# Patient Record
Sex: FEMALE | Race: WHITE | Employment: OTHER | URBAN - METROPOLITAN AREA
[De-identification: names, ages, dates, MRNs, and addresses within clinical notes are randomized per-mention and may not be internally consistent; named-entity substitution may affect disease eponyms.]

---

## 2017-01-27 RX ORDER — OXYBUTYNIN CHLORIDE 10 MG/1
TABLET, EXTENDED RELEASE ORAL
Qty: 90 TABLET | Refills: 1 | Status: SHIPPED | OUTPATIENT
Start: 2017-01-27 | End: 2017-07-25 | Stop reason: SDUPTHER

## 2017-03-27 ENCOUNTER — TELEPHONE (OUTPATIENT)
Dept: INTERNAL MEDICINE CLINIC | Age: 82
End: 2017-03-27

## 2017-07-25 RX ORDER — OXYBUTYNIN CHLORIDE 10 MG/1
TABLET, EXTENDED RELEASE ORAL
Qty: 90 TABLET | Refills: 0 | Status: SHIPPED | OUTPATIENT
Start: 2017-07-25 | End: 2017-10-19 | Stop reason: SDUPTHER

## 2017-09-05 RX ORDER — SIMVASTATIN 20 MG
TABLET ORAL
Qty: 90 TABLET | Refills: 0 | Status: SHIPPED | OUTPATIENT
Start: 2017-09-05 | End: 2017-12-19 | Stop reason: SDUPTHER

## 2017-10-20 RX ORDER — OXYBUTYNIN CHLORIDE 10 MG/1
TABLET, EXTENDED RELEASE ORAL
Qty: 90 TABLET | Refills: 0 | Status: SHIPPED | OUTPATIENT
Start: 2017-10-20 | End: 2018-01-26 | Stop reason: SDUPTHER

## 2017-12-19 RX ORDER — SIMVASTATIN 20 MG
TABLET ORAL
Qty: 90 TABLET | Refills: 0 | Status: SHIPPED | OUTPATIENT
Start: 2017-12-19 | End: 2018-03-30 | Stop reason: SDUPTHER

## 2018-01-26 RX ORDER — OXYBUTYNIN CHLORIDE 10 MG/1
TABLET, EXTENDED RELEASE ORAL
Qty: 90 TABLET | Refills: 0 | Status: SHIPPED | OUTPATIENT
Start: 2018-01-26 | End: 2018-04-27 | Stop reason: SDUPTHER

## 2018-03-30 RX ORDER — SIMVASTATIN 20 MG
TABLET ORAL
Qty: 90 TABLET | Refills: 0 | Status: SHIPPED | OUTPATIENT
Start: 2018-03-30 | End: 2018-07-14 | Stop reason: SDUPTHER

## 2018-04-27 RX ORDER — OXYBUTYNIN CHLORIDE 10 MG/1
TABLET, EXTENDED RELEASE ORAL
Qty: 90 TABLET | Refills: 0 | Status: SHIPPED | OUTPATIENT
Start: 2018-04-27 | End: 2018-07-25

## 2018-07-12 ASSESSMENT — ENCOUNTER SYMPTOMS
ABDOMINAL PAIN: 0
SHORTNESS OF BREATH: 0

## 2018-07-12 NOTE — PROGRESS NOTES
disease without esophagitis     3. Allergic rhinitis, unspecified chronicity, unspecified seasonality, unspecified trigger     4. Fatigue, unspecified type ---ck labs  TSH without Reflex    CBC Auto Differential   5. Bladder dysfunction --cont current rx---offer dr Ramón peralta for sec opinion  Adelfo Joseph MD ([facility])   6.  Abdominal pain, RUQ ---ck gb u/s  US GALLBLADDER RUQ   p-vax 23 ---today--info on TDaP and shgrx also given   ro 6mos --ext ov         Plan:

## 2018-07-14 RX ORDER — SIMVASTATIN 20 MG
TABLET ORAL
Qty: 90 TABLET | Refills: 0 | Status: SHIPPED | OUTPATIENT
Start: 2018-07-14 | End: 2018-10-17 | Stop reason: SDUPTHER

## 2018-07-17 ENCOUNTER — OFFICE VISIT (OUTPATIENT)
Dept: INTERNAL MEDICINE CLINIC | Age: 83
End: 2018-07-17

## 2018-07-17 VITALS
OXYGEN SATURATION: 97 % | HEART RATE: 65 BPM | TEMPERATURE: 97.6 F | WEIGHT: 112.4 LBS | SYSTOLIC BLOOD PRESSURE: 126 MMHG | DIASTOLIC BLOOD PRESSURE: 60 MMHG | HEIGHT: 62 IN | BODY MASS INDEX: 20.68 KG/M2 | RESPIRATION RATE: 16 BRPM

## 2018-07-17 DIAGNOSIS — R53.83 FATIGUE, UNSPECIFIED TYPE: ICD-10-CM

## 2018-07-17 DIAGNOSIS — Z23 NEED FOR PNEUMOCOCCAL VACCINATION: ICD-10-CM

## 2018-07-17 DIAGNOSIS — J30.9 ALLERGIC RHINITIS, UNSPECIFIED CHRONICITY, UNSPECIFIED SEASONALITY, UNSPECIFIED TRIGGER: ICD-10-CM

## 2018-07-17 DIAGNOSIS — K21.9 GASTROESOPHAGEAL REFLUX DISEASE WITHOUT ESOPHAGITIS: ICD-10-CM

## 2018-07-17 DIAGNOSIS — R10.11 ABDOMINAL PAIN, RUQ: ICD-10-CM

## 2018-07-17 DIAGNOSIS — N31.9 BLADDER DYSFUNCTION: ICD-10-CM

## 2018-07-17 DIAGNOSIS — E78.00 PURE HYPERCHOLESTEROLEMIA: Primary | ICD-10-CM

## 2018-07-17 LAB
A/G RATIO: 1.7 (ref 1.1–2.2)
ALBUMIN SERPL-MCNC: 4.5 G/DL (ref 3.4–5)
ALP BLD-CCNC: 74 U/L (ref 40–129)
ALT SERPL-CCNC: 13 U/L (ref 10–40)
ANION GAP SERPL CALCULATED.3IONS-SCNC: 15 MMOL/L (ref 3–16)
AST SERPL-CCNC: 22 U/L (ref 15–37)
BASOPHILS ABSOLUTE: 0 K/UL (ref 0–0.2)
BASOPHILS RELATIVE PERCENT: 0.4 %
BILIRUB SERPL-MCNC: <0.2 MG/DL (ref 0–1)
BUN BLDV-MCNC: 19 MG/DL (ref 7–20)
CALCIUM SERPL-MCNC: 9.7 MG/DL (ref 8.3–10.6)
CHLORIDE BLD-SCNC: 103 MMOL/L (ref 99–110)
CHOLESTEROL, TOTAL: 169 MG/DL (ref 0–199)
CO2: 26 MMOL/L (ref 21–32)
CREAT SERPL-MCNC: 0.8 MG/DL (ref 0.6–1.2)
EOSINOPHILS ABSOLUTE: 0 K/UL (ref 0–0.6)
EOSINOPHILS RELATIVE PERCENT: 0.7 %
GFR AFRICAN AMERICAN: >60
GFR NON-AFRICAN AMERICAN: >60
GLOBULIN: 2.7 G/DL
GLUCOSE BLD-MCNC: 83 MG/DL (ref 70–99)
HCT VFR BLD CALC: 39.9 % (ref 36–48)
HDLC SERPL-MCNC: 98 MG/DL (ref 40–60)
HEMOGLOBIN: 13.4 G/DL (ref 12–16)
LDL CHOLESTEROL CALCULATED: 57 MG/DL
LYMPHOCYTES ABSOLUTE: 2.2 K/UL (ref 1–5.1)
LYMPHOCYTES RELATIVE PERCENT: 39.5 %
MCH RBC QN AUTO: 33.2 PG (ref 26–34)
MCHC RBC AUTO-ENTMCNC: 33.6 G/DL (ref 31–36)
MCV RBC AUTO: 98.7 FL (ref 80–100)
MONOCYTES ABSOLUTE: 0.5 K/UL (ref 0–1.3)
MONOCYTES RELATIVE PERCENT: 8.6 %
NEUTROPHILS ABSOLUTE: 2.8 K/UL (ref 1.7–7.7)
NEUTROPHILS RELATIVE PERCENT: 50.8 %
PDW BLD-RTO: 13.9 % (ref 12.4–15.4)
PLATELET # BLD: 198 K/UL (ref 135–450)
PMV BLD AUTO: 9.7 FL (ref 5–10.5)
POTASSIUM SERPL-SCNC: 4.4 MMOL/L (ref 3.5–5.1)
RBC # BLD: 4.05 M/UL (ref 4–5.2)
SODIUM BLD-SCNC: 144 MMOL/L (ref 136–145)
TOTAL PROTEIN: 7.2 G/DL (ref 6.4–8.2)
TRIGL SERPL-MCNC: 71 MG/DL (ref 0–150)
TSH SERPL DL<=0.05 MIU/L-ACNC: 1.79 UIU/ML (ref 0.27–4.2)
VLDLC SERPL CALC-MCNC: 14 MG/DL
WBC # BLD: 5.5 K/UL (ref 4–11)

## 2018-07-17 PROCEDURE — 99215 OFFICE O/P EST HI 40 MIN: CPT | Performed by: INTERNAL MEDICINE

## 2018-07-17 PROCEDURE — 90732 PPSV23 VACC 2 YRS+ SUBQ/IM: CPT | Performed by: INTERNAL MEDICINE

## 2018-07-17 PROCEDURE — 90471 IMMUNIZATION ADMIN: CPT | Performed by: INTERNAL MEDICINE

## 2018-07-17 ASSESSMENT — ENCOUNTER SYMPTOMS
GASTROINTESTINAL NEGATIVE: 1
EYES NEGATIVE: 1
RESPIRATORY NEGATIVE: 1

## 2018-07-17 ASSESSMENT — PATIENT HEALTH QUESTIONNAIRE - PHQ9
SUM OF ALL RESPONSES TO PHQ9 QUESTIONS 1 & 2: 0
1. LITTLE INTEREST OR PLEASURE IN DOING THINGS: 0
SUM OF ALL RESPONSES TO PHQ QUESTIONS 1-9: 0
2. FEELING DOWN, DEPRESSED OR HOPELESS: 0

## 2018-07-19 ENCOUNTER — TELEPHONE (OUTPATIENT)
Dept: INTERNAL MEDICINE CLINIC | Age: 83
End: 2018-07-19

## 2018-07-19 DIAGNOSIS — R10.11 ABDOMINAL PAIN, RUQ: ICD-10-CM

## 2018-07-19 NOTE — TELEPHONE ENCOUNTER
[facility] Scheduling   #[phone]  Fax #[phone]    Requesting an order for  ultra sound gall bladder    Please Advise

## 2018-07-25 RX ORDER — OXYBUTYNIN CHLORIDE 10 MG/1
TABLET, EXTENDED RELEASE ORAL
Qty: 90 TABLET | Refills: 0 | Status: SHIPPED | OUTPATIENT
Start: 2018-07-25 | End: 2018-10-18 | Stop reason: SDUPTHER

## 2018-07-27 ENCOUNTER — TELEPHONE (OUTPATIENT)
Dept: INTERNAL MEDICINE CLINIC | Age: 83
End: 2018-07-27

## 2018-07-30 ENCOUNTER — TELEPHONE (OUTPATIENT)
Dept: INTERNAL MEDICINE CLINIC | Age: 83
End: 2018-07-30

## 2018-08-14 PROBLEM — R10.10 PAIN OF UPPER ABDOMEN: Status: ACTIVE | Noted: 2018-08-14

## 2018-08-14 ASSESSMENT — ENCOUNTER SYMPTOMS
ABDOMINAL PAIN: 0
SHORTNESS OF BREATH: 0

## 2018-08-15 ENCOUNTER — OFFICE VISIT (OUTPATIENT)
Dept: INTERNAL MEDICINE CLINIC | Age: 83
End: 2018-08-15

## 2018-08-15 VITALS
DIASTOLIC BLOOD PRESSURE: 66 MMHG | BODY MASS INDEX: 21.16 KG/M2 | HEART RATE: 58 BPM | TEMPERATURE: 98.2 F | WEIGHT: 115 LBS | OXYGEN SATURATION: 96 % | HEIGHT: 62 IN | RESPIRATION RATE: 16 BRPM | SYSTOLIC BLOOD PRESSURE: 106 MMHG

## 2018-08-15 DIAGNOSIS — R10.10 PAIN OF UPPER ABDOMEN: ICD-10-CM

## 2018-08-15 DIAGNOSIS — K21.9 GASTROESOPHAGEAL REFLUX DISEASE WITHOUT ESOPHAGITIS: ICD-10-CM

## 2018-08-15 DIAGNOSIS — E78.00 PURE HYPERCHOLESTEROLEMIA: Primary | ICD-10-CM

## 2018-08-15 LAB
BILIRUBIN, POC: NORMAL
BLOOD URINE, POC: NORMAL
CLARITY, POC: NORMAL
COLOR, POC: NORMAL
GLUCOSE URINE, POC: NORMAL
KETONES, POC: NORMAL
LEUKOCYTE EST, POC: NORMAL
NITRITE, POC: NORMAL
PH, POC: 6
PROTEIN, POC: NORMAL
SPECIFIC GRAVITY, POC: 1.02
UROBILINOGEN, POC: NORMAL

## 2018-08-15 PROCEDURE — 99214 OFFICE O/P EST MOD 30 MIN: CPT | Performed by: INTERNAL MEDICINE

## 2018-08-15 PROCEDURE — 81002 URINALYSIS NONAUTO W/O SCOPE: CPT | Performed by: INTERNAL MEDICINE

## 2018-10-17 RX ORDER — SIMVASTATIN 20 MG
TABLET ORAL
Qty: 90 TABLET | Refills: 0 | Status: SHIPPED | OUTPATIENT
Start: 2018-10-17 | End: 2019-01-28

## 2018-10-18 RX ORDER — OXYBUTYNIN CHLORIDE 10 MG/1
TABLET, EXTENDED RELEASE ORAL
Qty: 90 TABLET | Refills: 0 | Status: SHIPPED | OUTPATIENT
Start: 2018-10-18 | End: 2019-01-29 | Stop reason: SDUPTHER

## 2019-01-28 RX ORDER — SIMVASTATIN 20 MG
TABLET ORAL
Qty: 90 TABLET | Refills: 0 | Status: SHIPPED | OUTPATIENT
Start: 2019-01-28 | End: 2019-05-06 | Stop reason: SDUPTHER

## 2019-01-29 RX ORDER — OXYBUTYNIN CHLORIDE 10 MG/1
TABLET, EXTENDED RELEASE ORAL
Qty: 90 TABLET | Refills: 0 | Status: SHIPPED | OUTPATIENT
Start: 2019-01-29 | End: 2019-04-25 | Stop reason: SDUPTHER

## 2019-04-25 RX ORDER — OXYBUTYNIN CHLORIDE 10 MG/1
TABLET, EXTENDED RELEASE ORAL
Qty: 90 TABLET | Refills: 0 | Status: SHIPPED | OUTPATIENT
Start: 2019-04-25 | End: 2019-07-23 | Stop reason: SDUPTHER

## 2019-05-07 RX ORDER — SIMVASTATIN 20 MG
TABLET ORAL
Qty: 90 TABLET | Refills: 0 | Status: SHIPPED | OUTPATIENT
Start: 2019-05-07 | End: 2019-08-14 | Stop reason: SDUPTHER

## 2019-07-24 RX ORDER — OXYBUTYNIN CHLORIDE 10 MG/1
TABLET, EXTENDED RELEASE ORAL
Qty: 90 TABLET | Refills: 0 | Status: SHIPPED | OUTPATIENT
Start: 2019-07-24 | End: 2019-10-22 | Stop reason: SDUPTHER

## 2019-10-22 RX ORDER — OXYBUTYNIN CHLORIDE 10 MG/1
TABLET, EXTENDED RELEASE ORAL
Qty: 90 TABLET | Refills: 0 | Status: SHIPPED | OUTPATIENT
Start: 2019-10-22 | End: 2020-01-20

## 2020-01-20 RX ORDER — OXYBUTYNIN CHLORIDE 10 MG/1
TABLET, EXTENDED RELEASE ORAL
Qty: 90 TABLET | Refills: 0 | Status: SHIPPED | OUTPATIENT
Start: 2020-01-20 | End: 2020-04-14

## 2020-04-14 RX ORDER — OXYBUTYNIN CHLORIDE 10 MG/1
TABLET, EXTENDED RELEASE ORAL
Qty: 90 TABLET | Refills: 0 | Status: SHIPPED | OUTPATIENT
Start: 2020-04-14 | End: 2020-07-07

## 2020-06-23 PROBLEM — R19.8 CHANGE IN BOWEL MOVEMENT: Status: ACTIVE | Noted: 2020-06-23

## 2020-07-01 ENCOUNTER — HOSPITAL ENCOUNTER (OUTPATIENT)
Dept: ULTRASOUND IMAGING | Age: 85
Discharge: HOME | End: 2020-07-01
Payer: MEDICARE

## 2020-07-01 PROCEDURE — 76705 ECHO EXAM OF ABDOMEN: CPT

## 2020-07-07 RX ORDER — OXYBUTYNIN CHLORIDE 10 MG/1
TABLET, EXTENDED RELEASE ORAL
Qty: 90 TABLET | Refills: 0 | Status: SHIPPED | OUTPATIENT
Start: 2020-07-07 | End: 2020-10-12

## 2020-10-12 RX ORDER — OXYBUTYNIN CHLORIDE 10 MG/1
TABLET, EXTENDED RELEASE ORAL
Qty: 90 TABLET | Refills: 0 | Status: SHIPPED | OUTPATIENT
Start: 2020-10-12 | End: 2021-01-16

## 2021-01-16 RX ORDER — OXYBUTYNIN CHLORIDE 10 MG/1
TABLET, EXTENDED RELEASE ORAL
Qty: 90 TABLET | Refills: 0 | Status: SHIPPED | OUTPATIENT
Start: 2021-01-16 | End: 2021-04-13

## 2021-04-13 RX ORDER — SIMVASTATIN 20 MG
TABLET ORAL
Qty: 30 TABLET | Refills: 0 | Status: SHIPPED | OUTPATIENT
Start: 2021-04-13 | End: 2021-05-20

## 2021-04-13 RX ORDER — OXYBUTYNIN CHLORIDE 10 MG/1
TABLET, EXTENDED RELEASE ORAL
Qty: 90 TABLET | Refills: 0 | Status: SHIPPED | OUTPATIENT
Start: 2021-04-13 | End: 2021-07-12

## 2021-05-20 RX ORDER — SIMVASTATIN 20 MG
TABLET ORAL
Qty: 30 TABLET | Refills: 0 | Status: SHIPPED | OUTPATIENT
Start: 2021-05-20 | End: 2021-06-18

## 2021-06-18 RX ORDER — SIMVASTATIN 20 MG
TABLET ORAL
Qty: 30 TABLET | Refills: 0 | Status: SHIPPED | OUTPATIENT
Start: 2021-06-18 | End: 2021-10-22

## 2021-07-12 RX ORDER — OXYBUTYNIN CHLORIDE 10 MG/1
TABLET, EXTENDED RELEASE ORAL
Qty: 90 TABLET | Refills: 0 | Status: SHIPPED | OUTPATIENT
Start: 2021-07-12 | End: 2021-10-08

## 2021-10-08 RX ORDER — OXYBUTYNIN CHLORIDE 10 MG/1
TABLET, EXTENDED RELEASE ORAL
Qty: 90 TABLET | Refills: 0 | Status: SHIPPED | OUTPATIENT
Start: 2021-10-08 | End: 2022-01-07

## 2021-10-22 RX ORDER — SIMVASTATIN 20 MG
TABLET ORAL
Qty: 30 TABLET | Refills: 0 | Status: SHIPPED | OUTPATIENT
Start: 2021-10-22 | End: 2021-11-17

## 2022-02-28 PROBLEM — L29.9 ITCHY SKIN: Status: ACTIVE | Noted: 2022-02-28

## 2022-06-27 ENCOUNTER — HOSPITAL ENCOUNTER (OUTPATIENT)
Dept: SURGERY | Age: 87
Discharge: HOME | End: 2022-06-27
Payer: MEDICARE

## 2022-06-27 VITALS — DIASTOLIC BLOOD PRESSURE: 50 MMHG | SYSTOLIC BLOOD PRESSURE: 159 MMHG

## 2022-06-27 PROBLEM — S89.92XA INJURY OF LEFT LOWER EXTREMITY: Status: ACTIVE | Noted: 2022-06-27

## 2022-06-27 PROCEDURE — 6370000000 HC RX 637 (ALT 250 FOR IP): Performed by: OPHTHALMOLOGY

## 2022-06-27 PROCEDURE — 66821 AFTER CATARACT LASER SURGERY: CPT

## 2022-06-27 RX ORDER — TROPICAMIDE 10 MG/ML
1 SOLUTION/ DROPS OPHTHALMIC ONCE
Status: COMPLETED | OUTPATIENT
Start: 2022-06-27 | End: 2022-06-27

## 2022-06-27 RX ORDER — SODIUM CHLORIDE 0.9 % (FLUSH) 0.9 %
5-40 SYRINGE (ML) INJECTION PRN
Status: DISCONTINUED | OUTPATIENT
Start: 2022-06-27 | End: 2022-06-28 | Stop reason: HOSPADM

## 2022-06-27 RX ORDER — SODIUM CHLORIDE 9 MG/ML
INJECTION, SOLUTION INTRAVENOUS PRN
Status: DISCONTINUED | OUTPATIENT
Start: 2022-06-27 | End: 2022-06-28 | Stop reason: HOSPADM

## 2022-06-27 RX ORDER — SODIUM CHLORIDE 0.9 % (FLUSH) 0.9 %
5-40 SYRINGE (ML) INJECTION EVERY 12 HOURS SCHEDULED
Status: DISCONTINUED | OUTPATIENT
Start: 2022-06-27 | End: 2022-06-28 | Stop reason: HOSPADM

## 2022-06-27 RX ORDER — PHENYLEPHRINE HCL 2.5 %
1 DROPS OPHTHALMIC (EYE) ONCE
Status: COMPLETED | OUTPATIENT
Start: 2022-06-27 | End: 2022-06-27

## 2022-06-27 RX ADMIN — PHENYLEPHRINE HYDROCHLORIDE 1 DROP: 25 SOLUTION/ DROPS OPHTHALMIC at 10:37

## 2022-06-27 RX ADMIN — TROPICAMIDE 1 DROP: 10 SOLUTION/ DROPS OPHTHALMIC at 10:37

## 2022-06-27 NOTE — OP NOTE
[place] [address]  [phone]      6/27/2022    Patient name: Andrea Petty  YOB: 1926  MRN: [id number]    Allergies: Allergies   Allergen Reactions    Codeine     Sulfa Antibiotics        Pre-operative diagnosis:  After cataract obscuring vision of the right eye. Post-operative diagnosis:  Same    Procedure Performed:  YAG Capsulotomy of the right eye. Anesthesia:  None    Estimated Blood Loss: None    Specimens removed: None    Complications:  None    Description of Procedure: A Yag Capsulotomy was performed today on the right eye. Pt appears to be oriented to time, place, and person. Pre-op medications instilled in the right eye: Proparacaine 1 drop, Won-Synephrine 2.5% 1 drop topical and Mydriacyl 0.5% 1 drop topical. Patient is comfortable and will be released to self.      Electronically signed by: Dottie Pool MD,6/27/2022,11:39 AM

## 2022-07-11 ENCOUNTER — HOSPITAL ENCOUNTER (OUTPATIENT)
Dept: WOUND CARE | Age: 87
Discharge: HOME | End: 2022-07-11
Payer: MEDICARE

## 2022-07-11 VITALS
WEIGHT: 116 LBS | RESPIRATION RATE: 18 BRPM | TEMPERATURE: 98.6 F | DIASTOLIC BLOOD PRESSURE: 74 MMHG | SYSTOLIC BLOOD PRESSURE: 150 MMHG | HEIGHT: 62 IN | HEART RATE: 66 BPM | BODY MASS INDEX: 21.35 KG/M2

## 2022-07-11 DIAGNOSIS — M79.89 LEG SWELLING: ICD-10-CM

## 2022-07-11 DIAGNOSIS — S81.802A TRAUMATIC OPEN WOUND OF LEFT LOWER LEG, INITIAL ENCOUNTER: Primary | ICD-10-CM

## 2022-07-11 PROCEDURE — 99202 OFFICE O/P NEW SF 15 MIN: CPT | Performed by: NURSE PRACTITIONER

## 2022-07-11 PROCEDURE — 11042 DBRDMT SUBQ TIS 1ST 20SQCM/<: CPT

## 2022-07-11 PROCEDURE — 11042 DBRDMT SUBQ TIS 1ST 20SQCM/<: CPT | Performed by: NURSE PRACTITIONER

## 2022-07-11 PROCEDURE — 99203 OFFICE O/P NEW LOW 30 MIN: CPT

## 2022-07-11 RX ORDER — LIDOCAINE 50 MG/G
OINTMENT TOPICAL ONCE
Status: CANCELLED | OUTPATIENT
Start: 2022-07-11 | End: 2022-07-11

## 2022-07-11 RX ORDER — GINSENG 100 MG
CAPSULE ORAL ONCE
Status: CANCELLED | OUTPATIENT
Start: 2022-07-11 | End: 2022-07-11

## 2022-07-11 RX ORDER — GENTAMICIN SULFATE 1 MG/G
OINTMENT TOPICAL ONCE
Status: CANCELLED | OUTPATIENT
Start: 2022-07-11 | End: 2022-07-11

## 2022-07-11 RX ORDER — LIDOCAINE HYDROCHLORIDE 20 MG/ML
JELLY TOPICAL ONCE
Status: CANCELLED | OUTPATIENT
Start: 2022-07-11 | End: 2022-07-11

## 2022-07-11 RX ORDER — BACITRACIN ZINC AND POLYMYXIN B SULFATE 500; 1000 [USP'U]/G; [USP'U]/G
OINTMENT TOPICAL ONCE
Status: CANCELLED | OUTPATIENT
Start: 2022-07-11 | End: 2022-07-11

## 2022-07-11 RX ORDER — CLOBETASOL PROPIONATE 0.5 MG/G
OINTMENT TOPICAL ONCE
Status: CANCELLED | OUTPATIENT
Start: 2022-07-11 | End: 2022-07-11

## 2022-07-11 RX ORDER — LIDOCAINE HYDROCHLORIDE 40 MG/ML
SOLUTION TOPICAL ONCE
Status: CANCELLED | OUTPATIENT
Start: 2022-07-11 | End: 2022-07-11

## 2022-07-11 RX ORDER — LIDOCAINE 40 MG/G
CREAM TOPICAL ONCE
Status: CANCELLED | OUTPATIENT
Start: 2022-07-11 | End: 2022-07-11

## 2022-07-11 RX ORDER — BETAMETHASONE DIPROPIONATE 0.05 %
OINTMENT (GRAM) TOPICAL ONCE
Status: CANCELLED | OUTPATIENT
Start: 2022-07-11 | End: 2022-07-11

## 2022-07-11 RX ORDER — LIDOCAINE HYDROCHLORIDE 40 MG/ML
SOLUTION TOPICAL ONCE
Status: COMPLETED | OUTPATIENT
Start: 2022-07-11 | End: 2022-07-11

## 2022-07-11 RX ORDER — BACITRACIN, NEOMYCIN, POLYMYXIN B 400; 3.5; 5 [USP'U]/G; MG/G; [USP'U]/G
OINTMENT TOPICAL ONCE
Status: CANCELLED | OUTPATIENT
Start: 2022-07-11 | End: 2022-07-11

## 2022-07-11 RX ADMIN — LIDOCAINE HYDROCHLORIDE: 40 SOLUTION TOPICAL at 11:12

## 2022-07-11 ASSESSMENT — PAIN DESCRIPTION - DESCRIPTORS: DESCRIPTORS: ACHING

## 2022-07-11 ASSESSMENT — PAIN DESCRIPTION - PAIN TYPE: TYPE: ACUTE PAIN

## 2022-07-11 ASSESSMENT — PAIN DESCRIPTION - FREQUENCY: FREQUENCY: INTERMITTENT

## 2022-07-11 ASSESSMENT — PAIN DESCRIPTION - LOCATION: LOCATION: LEG

## 2022-07-11 ASSESSMENT — PAIN - FUNCTIONAL ASSESSMENT: PAIN_FUNCTIONAL_ASSESSMENT: ACTIVITIES ARE NOT PREVENTED

## 2022-07-11 ASSESSMENT — PAIN DESCRIPTION - ORIENTATION: ORIENTATION: LEFT

## 2022-07-11 ASSESSMENT — PAIN SCALES - GENERAL: PAINLEVEL_OUTOF10: 4

## 2022-07-11 ASSESSMENT — PAIN DESCRIPTION - ONSET: ONSET: ON-GOING

## 2022-07-11 NOTE — LETTER
[address]  [address] OFFICE [place] [address] [place] [facility]  [phone]  Dept: [phone]   TODAYS DATE: 7/7/2022    [facility] AT [facility]   Appointment Treatment Guidelines  Welcome Ms. Mccormack to the [address] [place] [address] Pkwy. We appreciate the confidence you have shown in choosing us as your wound care provider. Our goal is to heal your wound(s) as quickly as possible. Please read the items below regarding the nature of your appointments. 1. We will make every effort to schedule appointments that are convenient for you. Certain days and times may not be available, depending on your providers office hours and details of your care. 2. Patients will not necessarily be brought to an exam room in the order in which they arrive. Many providers work out of this office and patients are here for different procedures. Our goal is to serve you as quickly as possible. 3. We acknowledge that your time is valuable. Please remember that wound healing takes time and we appreciate your understanding that the length of each patients appointment will vary depending upon their need. 4. It is for your protection that we ask for insurance cards, photo ID, and new consent forms on your first visit and periodically throughout your treatment at all our facilities. 5. Wound Care treatment is known to be most effective when provided on a regular basis. Missed appointments, and not following the recommended plan of care can result in ineffective treatment and a poor outcome. If you find it difficult to keep appointments or to follow the recommended plan of care, it is your responsibility to let the staff know, so that we can work with you toward a solution. 6. If you need to miss an appointment, please call to let us know. We expect 24 hours notice for all cancellations.  We also expect missed visits to be rescheduled as soon as possible, preferably within the same week to promote the most effective healing time for your wound(s). 7. If you will be late for an appointment, please call our center to be sure that the provider can still see you when you arrive. If you are more than 15 minutes late your appointment may need to be rescheduled. 8. If two (2) appointments are missed without notifying us, your care plan may be discontinued. The same may happen if multiple visits are cancelled or rescheduled, even with notice. A missed visit is time when another patient, who also needs care, could have been seen. Thank you for your understanding and consideration.

## 2022-07-11 NOTE — PROGRESS NOTES
[address]   Progress Note and Procedure Note      Trace Conteh Newaygo  MEDICAL RECORD NUMBER:  [id number]  AGE: 80 y.o. GENDER: female  : 3/4/1926  EPISODE DATE:  2022    Subjective:     Chief Complaint   Patient presents with    Wound Check     initial visit. happened around 22, went to a clinic around 22 keflex given, when almost out of pills called PCP. PCP saw patient gave new prescription and changed bandage. has been using hydrogen peroxide, neosporin, bandaid and PCP referred here         HISTORY of PRESENT ILLNESS HPI     King Bailey is a 80 y.o. female who presents today for wound/ulcer evaluation. History of Wound Context: Trauma. Around 22, patient was spraying wasps and hit her left anterior tibial with a branch from the bushes. She was initially seen at Urgent Care and given Keflex QID X7 days. She saw her PCP on 22, had a Tdap, restartted Keflex 500 BID X10d and started pso and dsd. She was seen by her PCP again on 22 and the wound was still oozing blood with persistent pain. She started Augmentin and was sent to the [facility] for further evaluation. Wound/Ulcer Pain Timing/Severity: none  Quality of pain: N/A  Severity:  0 / 10   Modifying Factors: Pain worsens with local pressure and an increase in leg swelling  Associated Signs/Symptoms: edema, erythema and drainage    Ulcer Identification:  Ulcer Type: traumatic    Contributing Factors: edema    Acute Wound: Other: LLE open wound    PAST MEDICAL HISTORY        Diagnosis Date    Allergic rhinitis     Hyperlipidemia     Urinary urgency        PAST SURGICAL HISTORY    Past Surgical History:   Procedure Laterality Date    CATARACT REMOVAL      SIGMOIDOSCOPY  2017    dr Ayaka Mendez  polyp   hemorrhoids       FAMILY HISTORY    History reviewed. No pertinent family history.     SOCIAL HISTORY    Social History     Tobacco Use    Smoking status: Never Smoker    Smokeless tobacco: Never Used   Substance Use Topics    Alcohol use: Yes     Comment: socially     Drug use: No       ALLERGIES    Allergies   Allergen Reactions    Codeine     Sulfa Antibiotics        MEDICATIONS    Current Outpatient Medications on File Prior to Encounter   Medication Sig Dispense Refill    amoxicillin-clavulanate (AUGMENTIN) 875-125 MG per tablet Take 1 tablet by mouth 2 times daily for 10 days 20 tablet 0    amoxicillin-clavulanate (AUGMENTIN) 875-125 MG per tablet Take 1 tablet by mouth 2 times daily for 10 days 20 tablet 0    oxybutynin (DITROPAN-XL) 10 MG extended release tablet TAKE ONE TABLET BY MOUTH DAILY 90 tablet 0    simvastatin (ZOCOR) 20 MG tablet TAKE ONE TABLET BY MOUTH EVERY NIGHT AT BEDTIME 90 tablet 1    famotidine (PEPCID) 20 MG tablet Take 1 tablet by mouth 2 times daily 60 tablet 0    hydrocortisone (ANUSOL-HC) 2.5 % rectal cream Place rectally 2 times daily. 1 Tube 1    Multiple Vitamins-Minerals (ICAPS) CAPS Take  by mouth.  [DISCONTINUED] oxybutynin (DITROPAN-XL) 10 MG CR tablet TAKE 1 TABLET BY MOUTH DAILY 90 tablet 2    Calcium Acetate, Phos Binder, (CALCIUM ACETATE PO) Take  by mouth. No current facility-administered medications on file prior to encounter. REVIEW OF SYSTEMS    Pertinent items are noted in HPI.       Objective:      BP (!) 150/74   Pulse 66   Temp 98.6 °F (37 °C) (Infrared)   Resp 18   Ht 5' 2\" (1.575 m)   Wt 116 lb (52.6 kg)   BMI 21.22 kg/m²     Wt Readings from Last 3 Encounters:   07/11/22 116 lb (52.6 kg)   07/06/22 116 lb (52.6 kg)   06/28/22 115 lb (52.2 kg)       PHYSICAL EXAM    General Appearance: alert and oriented to person, place and time, well developed and well- nourished, in no acute distress  Skin: warm and dry, Left anterior tibial wound, erythematous periwound (on Augmentin)  Head: normocephalic and atraumatic  Eyes: pupils equal, round, and reactive to light, extraocular eye movements intact, conjunctivae normal  Neck: supple  Pulmonary/Chest: clear to auscultation bilaterally- no wheezes, rales or rhonchi, normal air movement, no respiratory distress  Cardiovascular: normal rate, regular rhythm, normal S1 and S2, no murmurs, rubs, clicks, or gallops, left DP +2, no carotid bruits  Extremities: no cyanosis or clubbing, LLE +4 pitting edema  Musculoskeletal: normal range of motion, no joint swelling, deformity or tenderness  Neurologic: reflexes normal and symmetric, no cranial nerve deficit, gait, coordination and speech normal      Assessment:        Problem List Items Addressed This Visit     Traumatic open wound of left lower leg - Primary    Leg swelling           Procedure Note  Indications:  Based on my examination of this patient's wound(s)/ulcer(s) today, debridement is required to promote healing and evaluate the wound base. Performed by: JOAQUIN Trevino - CNP    Consent obtained:  Yes    Time out taken:  Yes    Pain Control: Anesthetic  Anesthetic: 4% Lidocaine Liquid Topical       Debridement: Excisional Debridement    Using curette, scissors and forceps the wound(s)/ulcer(s) was/were debrided down through and including the removal of epidermis, dermis and subcutaneous tissue. Devitalized Tissue Debrided:  biofilm, slough and necrotic/eschar    Pre Debridement Measurements:  Are located in the [place]  Documentation Flow Sheet    Diabetic/Pressure/Non Pressure Ulcers only:  Ulcer: Non-Pressure ulcer, fat layer exposed     Wound/Ulcer #: 1    Post Debridement Measurements:  Wound/Ulcer Descriptions are Pre Debridement except measurements:    Wound 07/11/22 Leg Left;Lateral #1 (since about 7/4/22) (Active)   Wound Image   07/11/22 0900   Wound Cleansed Cleansed with saline 07/11/22 1004   Dressing/Treatment Adhesive bandage;Gauze dressing/dressing sponge; Pharmaceutical agent (see MAR) 07/11/22 1004   Wound Length (cm) 2 cm 07/11/22 0900   Wound Width (cm) 2.1 cm 07/11/22 0900   Wound Depth (cm) 0.1 cm 07/11/22 0900   Wound Surface Area (cm^2) 4.2 cm^2 07/11/22 0900   Wound Volume (cm^3) 0.42 cm^3 07/11/22 0900   Post-Procedure Length (cm) 2.1 cm 07/11/22 0909   Post-Procedure Width (cm) 2.2 cm 07/11/22 0909   Post-Procedure Depth (cm) 0.2 cm 07/11/22 0909   Post-Procedure Surface Area (cm^2) 4.62 cm^2 07/11/22 0909   Post-Procedure Volume (cm^3) 0.924 cm^3 07/11/22 0909   Wound Assessment Granulation tissue;Slough 07/11/22 0900   Drainage Amount Moderate 07/11/22 0900   Drainage Description Serosanguinous 07/11/22 0900   Odor None 07/11/22 0900   M[place]helle-wound Assessment Blanchable erythema 07/11/22 0900   Margins Undefined edges 07/11/22 0900   Wound Thickness Description not for Pressure Injury Full thickness 07/11/22 0900   Number of days: 0          Total Surface Area Debrided:  4.62 sq cm     Estimated Blood Loss:  Minimal    Hemostasis Achieved:  by pressure    Procedural Pain:  4  / 10     Post Procedural Pain:  0 / 10     Response to treatment:  Well tolerated by patient. PATIENT EDUCATION focused on mupirocin ointment application up to 3X daily, followed by Band-Aid. PATIENT EDUCATION focused on the need for some compression. We applied a medium SpandaGrip to her LLE. It improves blood flow in the leg, prevents blood clotting and inhibits the progression of a variety of venous disorders. The Spandagrip helps squeeze the venous blood back up toward the heart, to enhance circulation. Plan:     Treatment Note please see attached Discharge Instructions    Written patient dismissal instructions given to patient and signed by patient or POA.          Discharge Instructions       [address] and [facility]   Physician Orders and Discharge Instructions  [facility] [place] [address]  Telephone: [phone]    NAME:  Shereen Davila  DATE OF BIRTH:  3/4/1926  MEDICAL RECORD NUMBER:  [id number]  DATE:  7/11/2022    Wound Cleansing:   Do not scrub or use excessive force. Cleanse wound prior to applying a clean dressing with:  [x] Normal Saline  [x] Keep Wound Dry in Shower    [] Wound Cleanser   [] Cleanse wound with Mild Soap & Water  [] May Shower at Discharge   [] Other:       Topical Treatments:  Do not apply lotions, creams, or ointments to wound bed unless directed. [] Apply moisturizing lotion to skin surrounding the wound prior to dressing change.  [] Apply antifungal ointment to skin surrounding the wound prior to dressing change.  [] Apply thin film of moisture barrier ointment to skin immediately around wound. [] Other:       Dressings:           Wound Location : LEFT LOWER LEG  [x] Apply Primary Dressing:       [] MediHoney Gel [] Alginate with Silver [] Alginate   [] Collagen [] Collagen with Silver   [] Santyl with Moisten saline gauze     [] Hydrocolloid   [] MediHoney Alginate [] Foam with Silver   [] Foam   [] Hydrofera Blue    [] Mepilex Border    [] Moisten with Saline [] Hydrogel [] Mepitel     [x] Bactroban/Mupirocin [x] Polysporin  [] Other:      [] Pack wound loosely with  [] Iodoform   [] Plain Packing  [] Other     [x] Cover and Secure with:     [] Gauze [] Ambika [] Roll gauze   [] Ace Wrap [] Cover Roll Tape [] ABD     [x] Other: BIGGER BANDAID   Avoid contact of tape with skin.     [x] Change dressing:   [x]TWICE Daily    [] Every Other Day [] Three times per week   [] Once a week [] Do Not Change Dressing   [] Other:       Edema Control:  Apply: [] Compression Stocking []Right Leg []Left Leg   [] Tubigrip []Right Leg Double Layer []Left Leg Double Layer       []Right Leg Single Layer []Left Leg Single Layer   [x] SpandaGrip []Right Leg  [x]Left Leg      []Low compression 5-10 mm/Hg      [x]Medium compression 10-20 mm/Hg     []High compression  20-30 mm/Hg   every morning immediately when getting up should be applied to affected leg(s) from mid foot to knee making sure to cover the heel. Remove every night before going to bed. [x] Elevate leg(s) above the level of the heart when sitting. [x] Avoid prolonged standing in one place. Compression:  Apply: [] Multilayer Compression Wrap Applied in Clinic []RightLeg []Left Leg   [] Multi-layer compression. Do not get leg(s) with wrap wet. If wraps become too tight call the center or completely remove the wrap. [] Elevate leg(s) above the level of the heart when sitting. [] Avoid prolonged standing in one place. Off-Loading:   [] Off-loading when [] walking  [] in bed [] sitting  [] Total non-weight bearing  [] Right Leg  [] Left Leg   [] Assistive Device [] Walker [] Cane  [] Wheelchair  [] Crutches   [] Surgical shoe    [] Podus Boot(s)   [] Foam Boot(s)  [] Roll About    [] Cast Boot [] CROW Boot  [] Other:      Dietary:  [x] Diet as tolerated:   [] Calorie Diabetic Diet:   [] No Added Salt:  [x] Increase Protein:   [] Other:     Activity:  [x] Activity as tolerated:  [] Patient has no activity restrictions     [] Strict Bedrest: [] Remain off Work:     [] May return to full duty work:                                   [] Return to work with restrictions: If you are still having pain after you go home:  [x] Elevate the affected limb. [x] Use over-the-counter medications you would normally use for pain as permitted by your family doctor. [x] For persistent pain not relieved by the above interventions, please call your family doctor.              Return Appointment:  [] Wound and dressing supply provider:   [] ECF or Home Healthcare:  [] Wound Assessment: [] Physician or NP scheduled for Wound Assessment:   [x] Return Appointment: With Mirella Boogie  in  1 Week(s)  [] Ordered tests:     Nurse Case Manger:  BALAJI     Electronically signed by : Bernarda Henriquez on 7/11/2022 at 9:35 AM       [facility] Information: Should you experience any significant changes in your wound(s) or have questions about your wound care, please contact the [address] [place] [address] at [address] 8:00 am - 4:30 pm and Friday 8:00 am - 12:30 pm.  If you need help with your wound outside these hours and cannot wait until we are again available, contact your PCP or go to the hospital emergency room. PLEASE NOTE: IF YOU ARE UNABLE TO OBTAIN WOUND SUPPLIES, CONTINUE TO USE THE SUPPLIES YOU HAVE AVAILABLE UNTIL YOU ARE ABLE TO REACH US. IT IS MOST IMPORTANT TO KEEP THE WOUND COVERED AT ALL TIMES.      Physician Signature:_______________________    Date: ___________ Time:  ____________        Jennifer Carrera CNP   [ ] DR Briones Erps                               Electronically signed by JOAQUIN Garay CNP on 7/11/2022 at 10:29 AM

## 2022-07-11 NOTE — LETTER
[address]  [address] [facility]  [phone]  Dept: [phone]        Thank you Ms. Mccormack for choosing [facility] for your Wound Care needs. We hope you found your first visit both encouraging and educational.  We look forward to serving you throughout the healing process. Before your next visit please review the information you received in your [employer]. The first visit can be overwhelming and this is a useful tool to refresh what information you may have been given. If you find yourself with any questions prior to your upcoming appointment, please feel free to contact us. Often wounds can be challenging and it is our goal to see you through the healing process with as much support possible. Again, thank you for choosing [facility]!     Sincerely,      The Staff of [address] [place] [address] Pkwy and Hyperbaric Oxygen Therapy

## 2022-07-13 NOTE — DISCHARGE INSTRUCTIONS
Discharge Instructions        [address] and [facility] Oxygen Therapy   Physician Orders and Discharge Instructions  [address]   [address] [place], [place]  Telephone: [phone]     NAME:  Christina Jaime  YOB: 1926  MEDICAL RECORD NUMBER:  [id number]  DATE:  7/18/2022     Wound Cleansing:   Do not scrub or use excessive force. Cleanse wound prior to applying a clean dressing with:  [x] Normal Saline  [x] Keep Wound Dry in Shower    [] Wound Cleanser   [] Cleanse wound with Mild Soap & Water  [] May Shower at Discharge   [] Other:        Topical Treatments:  Do not apply lotions, creams, or ointments to wound bed unless directed. [] Apply moisturizing lotion to skin surrounding the wound prior to dressing change.  [] Apply antifungal ointment to skin surrounding the wound prior to dressing change.  [] Apply thin film of moisture barrier ointment to skin immediately around wound. [] Other:                  Dressings:                  Wound Location : LEFT LOWER LEG  [x] Apply Primary Dressing:                                         [x] Hydrofera Blue READY BORDER                               [] Other:      [] Cover and Secure with:                   [] Gauze         [] Ambika           [] Roll gauze              [] Ace Wrap   [] Cover Roll Tape     [] ABD                                      [] Other:               Avoid contact of tape with skin.      [] Change dressing:            [] Daily           [] Every Other Day    [] Three times per week              [] Once a week          [x] Do Not Change Dressing     [] Other:                   Edema Control:  Apply:  [] Compression Stocking       []Right Leg     []Left Leg              [] Tubigrip      []Right Leg Double Layer      []Left Leg Double Layer                                                  []Right Leg Single Layer       []Left Leg Single Layer              [] SpandaGrip           []Right Leg     []Left Leg                                      []Low compression 5-10 mm/Hg                                             []Medium compression 10-20 mm/Hg                                      []High compression  20-30 mm/Hg              every morning immediately when getting up should be applied to affected leg(s) from mid foot to knee making sure to cover the heel. Remove every night before going to bed. [x] Elevate leg(s) above the level of the heart when sitting. [x] Avoid prolonged standing in one place. Compression:  Apply:  [] Multilayer Compression Wrap Applied in Clinic    []RightLeg      []Left Leg              [] Multi-layer compression. Do not get leg(s) with wrap wet. If wraps become too tight call the center or completely remove the wrap. [] Elevate leg(s) above the level of the heart when sitting. [] Avoid prolonged standing in one place. Off-Loading:   [] Off-loading when    [] walking       [] in bed         [] sitting  [] Total non-weight bearing  [] Right Leg  [] Left Leg          [] Assistive Device     [] Walker        [] Cane           [] Wheelchair  [] Crutches              [] Surgical shoe    [] Podus Boot(s)   [] Foam Boot(s)  [] Roll About              [] Cast Boot   [] CROW Boot  [] Other:        Dietary:  [x] Diet as tolerated:     [] Calorie Diabetic Diet:           [] No Added Salt:  [x] Increase Protein:     [] Other:                Activity:  [x] Activity as tolerated:  [] Patient has no activity restrictions     [] Strict Bedrest: [] Remain off Work:     [] May return to full duty work:                                    [] Return to work with restrictions: If you are still having pain after you go home:  [x] Elevate the affected limb.     [x] Use over-the-counter medications you would normally use for pain as permitted by your family doctor. [x] For persistent pain not relieved by the above interventions, please call your family doctor. Return Appointment:  [] Wound and dressing supply provider:   [] ECF or Home Healthcare:  [] Wound Assessment:          [] Physician or NP scheduled for Wound Assessment:   [x] Return Appointment: With Dr Kendra Kiran    in  1 [facility])  [] Ordered tests:      Nurse Case Manger:  BALAJI    Electronically signed by Francesca Jones RN on 7/18/2022 at [address]: Should you experience any significant changes in your wound(s) or have questions about your wound care, please contact the [address] [place] [address] at [address] 8:00 am - 4:30 pm and Friday 8:00 am - 12:30 pm.  If you need help with your wound outside these hours and cannot wait until we are again available, contact your PCP or go to the hospital emergency room. PLEASE NOTE: IF YOU ARE UNABLE TO OBTAIN WOUND SUPPLIES, CONTINUE TO USE THE SUPPLIES YOU HAVE AVAILABLE UNTIL YOU ARE ABLE TO REACH US. IT IS MOST IMPORTANT TO KEEP THE WOUND COVERED AT ALL TIMES.      Physician Signature:_______________________     Date: ___________ Time:  ____________                               [X] Irma Dela Cruz CNP   [ ] DR Helen Pathak

## 2022-07-18 ENCOUNTER — HOSPITAL ENCOUNTER (OUTPATIENT)
Dept: WOUND CARE | Age: 87
Discharge: HOME | End: 2022-07-18
Payer: MEDICARE

## 2022-07-18 VITALS
SYSTOLIC BLOOD PRESSURE: 173 MMHG | TEMPERATURE: 97 F | DIASTOLIC BLOOD PRESSURE: 71 MMHG | RESPIRATION RATE: 18 BRPM | HEART RATE: 66 BPM

## 2022-07-18 DIAGNOSIS — S81.802A TRAUMATIC OPEN WOUND OF LEFT LOWER LEG, INITIAL ENCOUNTER: ICD-10-CM

## 2022-07-18 DIAGNOSIS — M79.89 LEG SWELLING: Primary | ICD-10-CM

## 2022-07-18 PROCEDURE — 97597 DBRDMT OPN WND 1ST 20 CM/<: CPT

## 2022-07-18 PROCEDURE — 97597 DBRDMT OPN WND 1ST 20 CM/<: CPT | Performed by: NURSE PRACTITIONER

## 2022-07-18 RX ORDER — BACITRACIN ZINC AND POLYMYXIN B SULFATE 500; 1000 [USP'U]/G; [USP'U]/G
OINTMENT TOPICAL ONCE
Status: CANCELLED | OUTPATIENT
Start: 2022-07-18 | End: 2022-07-18

## 2022-07-18 RX ORDER — LIDOCAINE HYDROCHLORIDE 20 MG/ML
JELLY TOPICAL ONCE
Status: CANCELLED | OUTPATIENT
Start: 2022-07-18 | End: 2022-07-18

## 2022-07-18 RX ORDER — GENTAMICIN SULFATE 1 MG/G
OINTMENT TOPICAL ONCE
Status: CANCELLED | OUTPATIENT
Start: 2022-07-18 | End: 2022-07-18

## 2022-07-18 RX ORDER — LIDOCAINE 50 MG/G
OINTMENT TOPICAL ONCE
Status: CANCELLED | OUTPATIENT
Start: 2022-07-18 | End: 2022-07-18

## 2022-07-18 RX ORDER — CLOBETASOL PROPIONATE 0.5 MG/G
OINTMENT TOPICAL ONCE
Status: CANCELLED | OUTPATIENT
Start: 2022-07-18 | End: 2022-07-18

## 2022-07-18 RX ORDER — BACITRACIN, NEOMYCIN, POLYMYXIN B 400; 3.5; 5 [USP'U]/G; MG/G; [USP'U]/G
OINTMENT TOPICAL ONCE
Status: CANCELLED | OUTPATIENT
Start: 2022-07-18 | End: 2022-07-18

## 2022-07-18 RX ORDER — LIDOCAINE 40 MG/G
CREAM TOPICAL ONCE
Status: CANCELLED | OUTPATIENT
Start: 2022-07-18 | End: 2022-07-18

## 2022-07-18 RX ORDER — LIDOCAINE HYDROCHLORIDE 40 MG/ML
SOLUTION TOPICAL ONCE
Status: COMPLETED | OUTPATIENT
Start: 2022-07-18 | End: 2022-07-18

## 2022-07-18 RX ORDER — LIDOCAINE HYDROCHLORIDE 40 MG/ML
SOLUTION TOPICAL ONCE
Status: CANCELLED | OUTPATIENT
Start: 2022-07-18 | End: 2022-07-18

## 2022-07-18 RX ORDER — GINSENG 100 MG
CAPSULE ORAL ONCE
Status: CANCELLED | OUTPATIENT
Start: 2022-07-18 | End: 2022-07-18

## 2022-07-18 RX ORDER — BETAMETHASONE DIPROPIONATE 0.05 %
OINTMENT (GRAM) TOPICAL ONCE
Status: CANCELLED | OUTPATIENT
Start: 2022-07-18 | End: 2022-07-18

## 2022-07-18 RX ADMIN — LIDOCAINE HYDROCHLORIDE 5 ML: 40 SOLUTION TOPICAL at 11:05

## 2022-07-18 ASSESSMENT — PAIN DESCRIPTION - PAIN TYPE: TYPE: ACUTE PAIN

## 2022-07-18 ASSESSMENT — PAIN SCALES - GENERAL
PAINLEVEL_OUTOF10: 0
PAINLEVEL_OUTOF10: 5

## 2022-07-18 ASSESSMENT — PAIN DESCRIPTION - FREQUENCY: FREQUENCY: CONTINUOUS

## 2022-07-18 ASSESSMENT — PAIN DESCRIPTION - ONSET: ONSET: ON-GOING

## 2022-07-18 ASSESSMENT — PAIN - FUNCTIONAL ASSESSMENT: PAIN_FUNCTIONAL_ASSESSMENT: ACTIVITIES ARE NOT PREVENTED

## 2022-07-18 ASSESSMENT — PAIN DESCRIPTION - LOCATION: LOCATION: LEG

## 2022-07-18 ASSESSMENT — PAIN DESCRIPTION - ORIENTATION: ORIENTATION: LEFT

## 2022-07-18 ASSESSMENT — PAIN DESCRIPTION - DESCRIPTORS: DESCRIPTORS: ACHING

## 2022-07-18 NOTE — PROGRESS NOTES
[address]   Progress Note and Procedure Note      [facility]  MEDICAL RECORD NUMBER:  [id number]  AGE: 80 y.o. GENDER: female  : 3/4/1926  EPISODE DATE:  2022    Subjective:     Chief Complaint   Patient presents with    Wound Check     Follow up visit left lower leg wound         HISTORY of PRESENT ILLNESS HPI    Gonzales Londono is a 80 y.o. female who presents today for wound/ulcer evaluation. History of Wound Context: Trauma. Around 22, patient was spraying wasps and hit her left anterior tibial with a branch from the bushes. She was initially seen at Urgent Care and given Keflex QID X7 days. She saw her PCP on 22, had a Tdap, restartted Keflex 500 BID X10d and started pso and dsd. She was seen by her PCP again on 22 and the wound was still oozing blood with persistent pain. She started Augmentin and was sent to the [facility] for further evaluation. Today is her 2nd visit, and her wound is granulating nicely. We are going to switch to [facility] today to help bring this wound to closure. Wound/Ulcer Pain Timing/Severity: none  Quality of pain: N/A  Severity:  0 / 10  Modifying Factors: Pain worsens with local pressure and an increase in leg swelling  Associated Signs/Symptoms: drainage     Ulcer Identification:  Ulcer Type: traumatic     Contributing Factors: edema     Acute Wound: Other: LLE open wound    PAST MEDICAL HISTORY        Diagnosis Date    Allergic rhinitis     Hyperlipidemia     Urinary urgency        PAST SURGICAL HISTORY    Past Surgical History:   Procedure Laterality Date    CATARACT REMOVAL      SIGMOIDOSCOPY  2017    dr Tenisha Alcocer  polyp   hemorrhoids       FAMILY HISTORY    History reviewed. No pertinent family history.     SOCIAL HISTORY    Social History     Tobacco Use    Smoking status: Never    Smokeless tobacco: Never   Substance Use Topics    Alcohol use: Yes     Comment: socially     Drug use: No       ALLERGIES    Allergies   Allergen Reactions    Codeine     Sulfa Antibiotics        MEDICATIONS    Current Outpatient Medications on File Prior to Encounter   Medication Sig Dispense Refill    mupirocin (BACTROBAN) 2 % ointment Apply topically 2-3 times daily. 22 g 1    oxybutynin (DITROPAN-XL) 10 MG extended release tablet TAKE ONE TABLET BY MOUTH DAILY 90 tablet 0    simvastatin (ZOCOR) 20 MG tablet TAKE ONE TABLET BY MOUTH EVERY NIGHT AT BEDTIME 90 tablet 1    famotidine (PEPCID) 20 MG tablet Take 1 tablet by mouth 2 times daily 60 tablet 0    hydrocortisone (ANUSOL-HC) 2.5 % rectal cream Place rectally 2 times daily. 1 Tube 1    Multiple Vitamins-Minerals (ICAPS) CAPS Take  by mouth. [DISCONTINUED] oxybutynin (DITROPAN-XL) 10 MG CR tablet TAKE 1 TABLET BY MOUTH DAILY 90 tablet 2    Calcium Acetate, Phos Binder, (CALCIUM ACETATE PO) Take  by mouth. No current facility-administered medications on file prior to encounter. REVIEW OF SYSTEMS    Pertinent items are noted in HPI.       Objective:      BP (!) 173/71   Pulse 66   Temp 97 °F (36.1 °C) (Infrared)   Resp 18     Wt Readings from Last 3 Encounters:   07/11/22 116 lb (52.6 kg)   07/06/22 116 lb (52.6 kg)   06/28/22 115 lb (52.2 kg)       PHYSICAL EXAM    General Appearance: alert and oriented to person, place and time, well developed and well- nourished, in no acute distress  Skin: warm and dry, Left anterior tibial wound, erythematous periwound (on Augmentin)  Head: normocephalic and atraumatic  Eyes: pupils equal, round, and reactive to light, extraocular eye movements intact, conjunctivae normal  Neck: supple  Pulmonary/Chest: normal air movement, no respiratory distress  Cardiovascular: normal rate, regular rhythm, normal S1 and S2, no murmurs, rubs, clicks, or gallops, left DP +2, no carotid bruits  Extremities: no cyanosis or clubbing, LLE no edema  Musculoskeletal: normal range of motion, no joint swelling, deformity or tenderness  Neurologic: reflexes normal and symmetric, no cranial nerve deficit, gait, coordination and speech normal      Assessment:        Problem List Items Addressed This Visit       Traumatic open wound of left lower leg    Relevant Orders    Initiate Outpatient Wound Care Protocol    Leg swelling - Primary    Relevant Orders    Initiate Outpatient Wound Care Protocol        Procedure Note  Indications:  Based on my examination of this patient's wound(s)/ulcer(s) today, debridement is required to promote healing and evaluate the wound base. Performed by: JOAQUIN Fernández CNP    Consent obtained:  Yes    Time out taken:  Yes    Pain Control: Anesthetic  Anesthetic: 4% Lidocaine Liquid Topical       Debridement: Selective Debridement/Non-Excisional Debridement    Using curette and forceps the wound(s)/ulcer(s) was/were debrided down through and including the removal of epidermis and dermis.         Devitalized Tissue Debrided:  slough    Pre Debridement Measurements:  Are located in the Wound/Ulcer Documentation Flow Sheet    Diabetic/Pressure/Non Pressure Ulcers only:  Ulcer: Non-Pressure ulcer, limited to breakdown of skin     Wound/Ulcer #: 1    Post Debridement Measurements:  Wound/Ulcer Descriptions are Pre Debridement except measurements:    Wound 07/11/22 Leg Left;Lateral #1 (since about 7/4/22) (Active)   Wound Image   07/11/22 0900   Wound Cleansed Cleansed with saline 07/18/22 1138   Dressing/Treatment Hydrofera blue 07/18/22 1138   Wound Length (cm) 1.3 cm 07/18/22 1056   Wound Width (cm) 1 cm 07/18/22 1056   Wound Depth (cm) 0.1 cm 07/18/22 1056   Wound Surface Area (cm^2) 1.3 cm^2 07/18/22 1056   Change in Wound Size % (l*w) 69.05 07/18/22 1056   Wound Volume (cm^3) 0.13 cm^3 07/18/22 1056   Wound Healing % 69 07/18/22 1056   Post-Procedure Length (cm) 1.4 cm 07/18/22 1119   Post-Procedure Width (cm) 1.1 cm 07/18/22 1119   Post-Procedure Depth (cm) 0.1 cm 07/18/22 1119   Post-Procedure Dressings:                  Wound Location : LEFT LOWER LEG  [x] Apply Primary Dressing:                                         [x] Hydrofera Blue READY BORDER                               [] Other:      [] Cover and Secure with:                   [] Gauze         [] Ambika           [] Roll gauze              [] Ace Wrap   [] Cover Roll Tape     [] ABD                                      [] Other:               Avoid contact of tape with skin. [] Change dressing:            [] Daily           [] Every Other Day    [] Three times per week              [] Once a week          [x] Do Not Change Dressing     [] Other:                   Edema Control:  Apply:  [] Compression Stocking       []Right Leg     []Left Leg              [] Tubigrip      []Right Leg Double Layer      []Left Leg Double Layer                                                  []Right Leg Single Layer       []Left Leg Single Layer              [] SpandaGrip           []Right Leg     []Left Leg                                      []Low compression 5-10 mm/Hg                                             []Medium compression 10-20 mm/Hg                                      []High compression  20-30 mm/Hg              every morning immediately when getting up should be applied to affected leg(s) from mid foot to knee making sure to cover the heel. Remove every night before going to bed. [x] Elevate leg(s) above the level of the heart when sitting. [x] Avoid prolonged standing in one place. Compression:  Apply:  [] Multilayer Compression Wrap Applied in Clinic    []RightLeg      []Left Leg              [] Multi-layer compression. Do not get leg(s) with wrap wet. If wraps become too tight call the center or completely remove the wrap. [] Elevate leg(s) above the level of the heart when sitting. [] Avoid prolonged standing in one place.      Off-Loading: [] Off-loading when    [] walking       [] in bed         [] sitting  [] Total non-weight bearing  [] Right Leg  [] Left Leg          [] Assistive Device     [] Walker        [] Cane           [] Wheelchair  [] Crutches              [] Surgical shoe    [] Podus Boot(s)   [] Foam Boot(s)  [] Roll About              [] Cast Boot   [] CROW Boot  [] Other:        Dietary:  [x] Diet as tolerated:     [] Calorie Diabetic Diet:           [] No Added Salt:  [x] Increase Protein:     [] Other:                Activity:  [x] Activity as tolerated:  [] Patient has no activity restrictions     [] Strict Bedrest: [] Remain off Work:     [] May return to full duty work:                                    [] Return to work with restrictions: If you are still having pain after you go home:  [x] Elevate the affected limb. [x] Use over-the-counter medications you would normally use for pain as permitted by your family doctor. [x] For persistent pain not relieved by the above interventions, please call your family doctor. Return Appointment:  [] Wound and dressing supply provider:   [] ECF or Home Healthcare:  [] Wound Assessment:          [] Physician or NP scheduled for Wound Assessment:   [x] Return Appointment: With Dr Nolasco Left    in  1 [facility])  [] Ordered tests:      Nurse Case Manger:  BALAJI    Electronically signed by Severiano Ferrell RN on 7/18/2022 at [address]: Should you experience any significant changes in your wound(s) or have questions about your wound care, please contact the [address] [place] [address] at [address] 8:00 am - 4:30 pm and Friday 8:00 am - 12:30 pm.  If you need help with your wound outside these hours and cannot wait until we are again available, contact your PCP or go to the hospital emergency room.       PLEASE NOTE: IF YOU ARE UNABLE TO OBTAIN WOUND SUPPLIES, CONTINUE TO USE THE SUPPLIES YOU HAVE AVAILABLE UNTIL YOU ARE ABLE TO REACH US. IT IS MOST IMPORTANT TO KEEP THE WOUND COVERED AT ALL TIMES.      Physician Signature:_______________________     Date: ___________ Time:  ____________                               [X] Marquise Short CNP   [ ] DR Arianne Ndiaye          Electronically signed by JOAQUIN Varela CNP on 7/18/2022 at 12:52 PM

## 2022-07-20 NOTE — DISCHARGE INSTRUCTIONS
[address] [facility]   Physician Orders and Discharge Instructions  [address]  [address], [place]  Telephone: [phone]     NAME:  Gonzales Londono  YOB: 1926  MEDICAL RECORD NUMBER:  [id number]  DATE:  7/25/2022     Wound Cleansing:  Do not scrub or use excessive force. Cleanse wound prior to applying a clean dressing with:  [x] Normal Saline  [x] Keep Wound Dry in Shower    [] Wound Cleanser  [] Cleanse wound with Mild Soap & Water  [] May Shower at Discharge  [] Other:        Topical Treatments:  Do not apply lotions, creams, or ointments to wound bed unless directed. [] Apply moisturizing lotion to skin surrounding the wound prior to dressing change.  [] Apply antifungal ointment to skin surrounding the wound prior to dressing change.  [] Apply thin film of moisture barrier ointment to skin immediately around wound. [] Other:                  Dressings:                  Wound Location : LEFT LOWER LEG  [x] Apply Primary Dressing:                                         [x] Hydrofera Blue READY BORDER                               [] Other:       [] Cover and Secure with:                   [] Gauze         [] Ambika           [] Roll gauze              [] Ace Wrap   [] Cover Roll Tape     [] ABD                                      [] Other:              Avoid contact of tape with skin.      [] Change dressing:            [] Daily           [] Every Other Day    [] Three times per week              [] Once a week          [x] Do Not Change Dressing     [] Other:                   Edema Control:  Apply:  [] Compression Stocking       []Right Leg     []Left Leg              [] Tubigrip      []Right Leg Double Layer      []Left Leg Double Layer                                                  []Right Leg Single Layer       []Left Leg Single Layer              [] SpandaGrip []Right Leg     []Left Leg                                      []Low compression 5-10 mm/Hg                                             []Medium compression 10-20 mm/Hg                                      []High compression  20-30 mm/Hg              every morning immediately when getting up should be applied to affected leg(s) from mid foot to knee making sure to cover the heel. Remove every night before going to bed. [x] Elevate leg(s) above the level of the heart when sitting. [x] Avoid prolonged standing in one place. Compression:  Apply:  [] Multilayer Compression Wrap Applied in Clinic    []RightLeg      []Left Leg              [] Multi-layer compression. Do not get leg(s) with wrap wet. If wraps become too tight call the center or completely remove the wrap. [] Elevate leg(s) above the level of the heart when sitting. [] Avoid prolonged standing in one place. Off-Loading:   [] Off-loading when    [] walking       [] in bed         [] sitting  [] Total non-weight bearing  [] Right Leg  [] Left Leg          [] Assistive Device     [] Walker        [] Cane           [] Wheelchair  [] Crutches              [] Surgical shoe    [] Podus Boot(s)   [] Foam Boot(s)  [] Roll About              [] Cast Boot   [] CROW Boot  [] Other:        Dietary:  [x] Diet as tolerated:     [] Calorie Diabetic Diet:           [] No Added Salt:  [x] Increase Protein:     [] Other:                Activity:  [x] Activity as tolerated:  [] Patient has no activity restrictions     [] Strict Bedrest: [] Remain off Work:     [] May return to full duty work:                                    [] Return to work with restrictions: If you are still having pain after you go home:  [x] Elevate the affected limb. [x] Use over-the-counter medications you would normally use for pain as permitted by your family doctor.   [x] For persistent pain not relieved by the above interventions, please call your family doctor. Return Appointment:  [] Wound and dressing supply provider:  [] ECF or Home Healthcare:  [] Wound Assessment:          [] Physician or NP scheduled for Wound Assessment:  [x] Return Appointment: With Arianna Gu CNP   in  1 Week(s)  [] Ordered tests:     Nurse Case Manger:  BALAJI     Electronically signed by Guera Hernandez RN on 7/25/2022 at 10:57 [address] [facility]: Should you experience any significant changes in your wound(s) or have questions about your wound care, please contact the [address] [place] [address] at [address] 8:00 am - 4:30 pm and Friday 8:00 am - 12:30 pm.  If you need help with your wound outside these hours and cannot wait until we are again available, contact your PCP or go to the hospital emergency room. PLEASE NOTE: IF YOU ARE UNABLE TO OBTAIN WOUND SUPPLIES, CONTINUE TO USE THE SUPPLIES YOU HAVE AVAILABLE UNTIL YOU ARE ABLE TO REACH US. IT IS MOST IMPORTANT TO KEEP THE WOUND COVERED AT ALL TIMES.      Physician Signature:_______________________     Date: ___________ Time:  ____________                               [ ] Aldona Auerbach CNP [ Monda Hoguet DR Yisroel Ormond

## 2022-07-25 ENCOUNTER — HOSPITAL ENCOUNTER (OUTPATIENT)
Dept: WOUND CARE | Age: 87
Discharge: HOME | End: 2022-07-25
Payer: MEDICARE

## 2022-07-25 VITALS
DIASTOLIC BLOOD PRESSURE: 83 MMHG | RESPIRATION RATE: 20 BRPM | TEMPERATURE: 97 F | SYSTOLIC BLOOD PRESSURE: 152 MMHG | HEART RATE: 73 BPM

## 2022-07-25 DIAGNOSIS — S81.802A TRAUMATIC OPEN WOUND OF LEFT LOWER LEG, INITIAL ENCOUNTER: ICD-10-CM

## 2022-07-25 DIAGNOSIS — M79.89 LEG SWELLING: ICD-10-CM

## 2022-07-25 DIAGNOSIS — S81.802D TRAUMATIC OPEN WOUND OF LEFT LOWER LEG, SUBSEQUENT ENCOUNTER: Primary | ICD-10-CM

## 2022-07-25 PROCEDURE — 11042 DBRDMT SUBQ TIS 1ST 20SQCM/<: CPT | Performed by: SURGERY

## 2022-07-25 PROCEDURE — 11042 DBRDMT SUBQ TIS 1ST 20SQCM/<: CPT

## 2022-07-25 RX ORDER — LIDOCAINE 50 MG/G
OINTMENT TOPICAL ONCE
OUTPATIENT
Start: 2022-07-25 | End: 2022-07-25

## 2022-07-25 RX ORDER — LIDOCAINE HYDROCHLORIDE 20 MG/ML
JELLY TOPICAL ONCE
OUTPATIENT
Start: 2022-07-25 | End: 2022-07-25

## 2022-07-25 RX ORDER — BETAMETHASONE DIPROPIONATE 0.05 %
OINTMENT (GRAM) TOPICAL ONCE
OUTPATIENT
Start: 2022-07-25 | End: 2022-07-25

## 2022-07-25 RX ORDER — GENTAMICIN SULFATE 1 MG/G
OINTMENT TOPICAL ONCE
OUTPATIENT
Start: 2022-07-25 | End: 2022-07-25

## 2022-07-25 RX ORDER — CLOBETASOL PROPIONATE 0.5 MG/G
OINTMENT TOPICAL ONCE
OUTPATIENT
Start: 2022-07-25 | End: 2022-07-25

## 2022-07-25 RX ORDER — GINSENG 100 MG
CAPSULE ORAL ONCE
OUTPATIENT
Start: 2022-07-25 | End: 2022-07-25

## 2022-07-25 RX ORDER — BACITRACIN ZINC AND POLYMYXIN B SULFATE 500; 1000 [USP'U]/G; [USP'U]/G
OINTMENT TOPICAL ONCE
OUTPATIENT
Start: 2022-07-25 | End: 2022-07-25

## 2022-07-25 RX ORDER — LIDOCAINE HYDROCHLORIDE 40 MG/ML
SOLUTION TOPICAL ONCE
Status: COMPLETED | OUTPATIENT
Start: 2022-07-25 | End: 2022-07-25

## 2022-07-25 RX ORDER — LIDOCAINE HYDROCHLORIDE 40 MG/ML
SOLUTION TOPICAL ONCE
OUTPATIENT
Start: 2022-07-25 | End: 2022-07-25

## 2022-07-25 RX ORDER — BACITRACIN, NEOMYCIN, POLYMYXIN B 400; 3.5; 5 [USP'U]/G; MG/G; [USP'U]/G
OINTMENT TOPICAL ONCE
OUTPATIENT
Start: 2022-07-25 | End: 2022-07-25

## 2022-07-25 RX ORDER — LIDOCAINE 40 MG/G
CREAM TOPICAL ONCE
OUTPATIENT
Start: 2022-07-25 | End: 2022-07-25

## 2022-07-25 RX ADMIN — LIDOCAINE HYDROCHLORIDE: 40 SOLUTION TOPICAL at 10:49

## 2022-07-25 ASSESSMENT — PAIN SCALES - GENERAL: PAINLEVEL_OUTOF10: 0

## 2022-07-25 NOTE — PROGRESS NOTES
[address]   Progress Note and Procedure Note      Robyn Easley [place]  MEDICAL RECORD NUMBER:  [id number]  AGE: 80 y.o. GENDER: female  : 3/4/1926  EPISODE DATE:  2022         Chief Complaint   Patient presents with    Wound Check       Follow up visit left lower leg wound         HISTORY of PRESENT ILLNESS HPI     Ria Dozier is a 80 y.o. female who presents today for wound/ulcer evaluation. History of Wound Context: Trauma. Around 22, patient was spraying wasps and hit her left anterior tibial with a branch from the bushes. She was initially seen at Urgent Care and given Keflex QID X7 days. She saw her PCP on 22, had a Tdap, restartted Keflex 500 BID X10d and started pso and dsd. She was seen by her PCP again on 22 and the wound was still oozing blood with persistent pain. She started Augmentin and was sent to the [facility] for further evaluation. Today is her 2nd visit, and her wound is granulating nicely. We are going to switch to [facility] today to help bring this wound to closure. Wound/Ulcer Pain Timing/Severity: none  Quality of pain: N/A  Severity:  0 / 10  Modifying Factors: Pain worsens with local pressure and an increase in leg swelling  Associated Signs/Symptoms: drainage     Ulcer Identification:  Ulcer Type: traumatic     Contributing Factors: edema     Acute Wound: Other: LLE open wound     PAST MEDICAL HISTORY     Past Medical History             Diagnosis Date    Allergic rhinitis      Hyperlipidemia      Urinary urgency              PAST SURGICAL HISTORY     Past Surgical History         Past Surgical History:   Procedure Laterality Date    CATARACT REMOVAL        SIGMOIDOSCOPY   2017     dr Ashley Velasquez  polyp   hemorrhoids            FAMILY HISTORY     Family History   History reviewed. No pertinent family history.         SOCIAL HISTORY     Social History            Tobacco Use    Smoking status: Never Smokeless tobacco: Never   Substance Use Topics    Alcohol use: Yes       Comment: socially    Drug use: No         ALLERGIES          Allergies   Allergen Reactions    Codeine      Sulfa Antibiotics           MEDICATIONS            Current Outpatient Medications on File Prior to Encounter   Medication Sig Dispense Refill    mupirocin (BACTROBAN) 2 % ointment Apply topically 2-3 times daily. 22 g 1    oxybutynin (DITROPAN-XL) 10 MG extended release tablet TAKE ONE TABLET BY MOUTH DAILY 90 tablet 0    simvastatin (ZOCOR) 20 MG tablet TAKE ONE TABLET BY MOUTH EVERY NIGHT AT BEDTIME 90 tablet 1    famotidine (PEPCID) 20 MG tablet Take 1 tablet by mouth 2 times daily 60 tablet 0    hydrocortisone (ANUSOL-HC) 2.5 % rectal cream Place rectally 2 times daily. 1 Tube 1    Multiple Vitamins-Minerals (ICAPS) CAPS Take  by mouth. [DISCONTINUED] oxybutynin (DITROPAN-XL) 10 MG CR tablet TAKE 1 TABLET BY MOUTH DAILY 90 tablet 2    Calcium Acetate, Phos Binder, (CALCIUM ACETATE PO) Take  by mouth. No current facility-administered medications on file prior to encounter. REVIEW OF SYSTEMS     Pertinent items are noted in HPI.         Objective:       BP (!) 173/71   Pulse 66   Temp 97 °F (36.1 °C) (Infrared)   Resp 18          Wt Readings from Last 3 Encounters:   07/11/22 116 lb (52.6 kg)   07/06/22 116 lb (52.6 kg)   06/28/22 115 lb (52.2 kg)         PHYSICAL EXAM     General Appearance: alert and oriented to person, place and time, well developed and well- nourished, in no acute distress  Skin: warm and dry, Left anterior tibial wound, erythematous periwound (on Augmentin)  Head: normocephalic and atraumatic  Eyes: pupils equal, round, and reactive to light, extraocular eye movements intact, conjunctivae normal  Neck: supple  Pulmonary/Chest: normal air movement, no respiratory distress  Cardiovascular: normal rate, regular rhythm, normal S1 and S2, no murmurs, rubs, clicks, or gallops, left DP +2, no carotid bruits  Extremities: no cyanosis or clubbing, LLE no edema  Musculoskeletal: normal range of motion, no joint swelling, deformity or tenderness  Neurologic: reflexes normal and symmetric, no cranial nerve deficit, gait, coordination and speech normal        Assessment:         Problem List Items Addressed This Visit         Traumatic open wound of left lower leg     Relevant Orders     Initiate Outpatient Wound Care Protocol     Leg swelling - Primary     Relevant Orders     Initiate Outpatient Wound Care Protocol       Excisional Debridement Procedure Note  Indications:  Based on my examination of this patient's wound(s)/ulcer(s) today, debridement is required to promote healing and evaluate the wound base. Performed by: Cassie Mccoy MD    Consent obtained? Yes    Time out taken: Yes    Pain Control: Anesthetic: 4% Lidocaine Liquid Topical     Debridement:Excisional Debridement    Using curette the wound/ulcer was sharply debrided    down through and included excision of  epidermis, dermis, and subcutaneous tissue.         Devitalized Tissue Debrided:  fibrin, biofilm, and slough      Pre Debridement Measurements:  Are located in the [place]  Documentation Flow Sheet   Wound/Ulcer #: 1     Post  Debridement Measurements:  Wound 07/11/22 Leg Left;Lateral #1 (since about 7/4/22) (Active)   Wound Image   07/11/22 0900   Wound Cleansed Cleansed with saline 07/18/22 1138   Dressing/Treatment Hydrofera blue 07/18/22 1138   Wound Length (cm) 0.4 cm 07/25/22 1044   Wound Width (cm) 0.4 cm 07/25/22 1044   Wound Depth (cm) 0.1 cm 07/25/22 1044   Wound Surface Area (cm^2) 0.16 cm^2 07/25/22 1044   Change in Wound Size % (l*w) 96.19 07/25/22 1044   Wound Volume (cm^3) 0.016 cm^3 07/25/22 1044   Wound Healing % 96 07/25/22 1044   Post-Procedure Length (cm) 0.5 cm 07/25/22 1056   Post-Procedure Width (cm) 0.5 cm 07/25/22 1056   Post-Procedure Depth (cm) 0.1 cm 07/25/22 1056   Post-Procedure Surface Area (cm^2) 0.25 cm^2 07/25/22 1056   Post-Procedure Volume (cm^3) 0.025 cm^3 07/25/22 1056   Wound Assessment Granulation tissue;Slough 07/25/22 1044   Drainage Amount Small 07/25/22 1044   Drainage Description Serosanguinous 07/25/22 1044   Odor None 07/25/22 1044   Mechelle-wound Assessment Dry/flaky 07/25/22 1044   Margins Undefined edges 07/25/22 1044   Wound Thickness Description not for Pressure Injury Full thickness 07/25/22 1044   Number of days: 14       Percent of Wound/Ulcer Debrided: 100%    Total Surface Area Debrided:  0.25 sq cm    Diabetic/Pressure/Non Pressure Ulcers only:  Ulcer: N/A    Bleeding: Minimal    Hemostasis Achieved: by pressure    Procedural Pain: 0  / 10     Post Procedural Pain: 0 / 10     Response to treatment:  Well tolerated by patient. Plan:     Treatment Note please see attached Discharge Instructions    Written patient dismissal instructions given to patient and signed by patient or POA. Discharge Instructions         [address] and Hyper[facility] Oxygen Therapy   Physician Orders and Discharge Instructions  [facility]  [address], [facility] [address]  Telephone: [phone]     NAME:  Frank Ghotra  YOB: 1926  MEDICAL RECORD NUMBER:  [id number]  DATE:  7/25/2022     Wound Cleansing:  Do not scrub or use excessive force. Cleanse wound prior to applying a clean dressing with:  [x] Normal Saline  [x] Keep Wound Dry in Shower    [] Wound Cleanser  [] Cleanse wound with Mild Soap & Water  [] May Shower at Discharge  [] Other:        Topical Treatments:  Do not apply lotions, creams, or ointments to wound bed unless directed.   [] Apply moisturizing lotion to skin surrounding the wound prior to dressing change.  [] Apply antifungal ointment to skin surrounding the wound prior to dressing change.  [] Apply thin film of moisture barrier ointment to skin immediately around wound.  [] Other:                  Dressings:                  Wound Location : LEFT LOWER LEG  [x] Apply Primary Dressing:                                         [x] Hydrofera Blue READY BORDER                               [] Other:       [] Cover and Secure with:                   [] Gauze         [] Ambika           [] Roll gauze              [] Ace Wrap   [] Cover Roll Tape     [] ABD                                      [] Other:              Avoid contact of tape with skin. [] Change dressing:            [] Daily           [] Every Other Day    [] Three times per week              [] Once a week          [x] Do Not Change Dressing     [] Other:                   Edema Control:  Apply:  [] Compression Stocking       []Right Leg     []Left Leg              [] Tubigrip      []Right Leg Double Layer      []Left Leg Double Layer                                                  []Right Leg Single Layer       []Left Leg Single Layer              [] SpandaGrip           []Right Leg     []Left Leg                                      []Low compression 5-10 mm/Hg                                             []Medium compression 10-20 mm/Hg                                      []High compression  20-30 mm/Hg              every morning immediately when getting up should be applied to affected leg(s) from mid foot to knee making sure to cover the heel. Remove every night before going to bed. [x] Elevate leg(s) above the level of the heart when sitting. [x] Avoid prolonged standing in one place. Compression:  Apply:  [] Multilayer Compression Wrap Applied in Clinic    []RightLeg      []Left Leg              [] Multi-layer compression. Do not get leg(s) with wrap wet. If wraps become too tight call the center or completely remove the wrap. [] Elevate leg(s) above the level of the heart when sitting.                  [] Avoid prolonged standing in one place. Off-Loading:   [] Off-loading when    [] walking       [] in bed         [] sitting  [] Total non-weight bearing  [] Right Leg  [] Left Leg          [] Assistive Device     [] Walker        [] Cane           [] Wheelchair  [] Crutches              [] Surgical shoe    [] Podus Boot(s)   [] Foam Boot(s)  [] Roll About              [] Cast Boot   [] CROW Boot  [] Other:        Dietary:  [x] Diet as tolerated:     [] Calorie Diabetic Diet:           [] No Added Salt:  [x] Increase Protein:     [] Other:                Activity:  [x] Activity as tolerated:  [] Patient has no activity restrictions     [] Strict Bedrest: [] Remain off Work:     [] May return to full duty work:                                    [] Return to work with restrictions: If you are still having pain after you go home:  [x] Elevate the affected limb. [x] Use over-the-counter medications you would normally use for pain as permitted by your family doctor. [x] For persistent pain not relieved by the above interventions, please call your family doctor. Return Appointment:  [] Wound and dressing supply provider:  [] ECF or Home Healthcare:  [] Wound Assessment:          [] Physician or NP scheduled for Wound Assessment:  [x] Return Appointment: With Diane Donnelly CNP   in  1 Week(s)  [] Ordered tests:     Nurse Case Slava CARPIO     Electronically signed by Farhad Sumner RN on 7/25/2022 at 10:57 [address] [facility]: Should you experience any significant changes in your wound(s) or have questions about your wound care, please contact the [facility] at [address] 8:00 am - 4:30 pm and Friday 8:00 am - 12:30 pm.  If you need help with your wound outside these hours and cannot wait until we are again available, contact your PCP or go to the hospital emergency room.      PLEASE NOTE: IF YOU ARE UNABLE TO OBTAIN WOUND SUPPLIES, CONTINUE TO USE THE SUPPLIES YOU HAVE AVAILABLE UNTIL YOU ARE ABLE TO REACH US. IT IS MOST IMPORTANT TO KEEP THE WOUND COVERED AT ALL TIMES.      Physician Signature:_______________________     Date: ___________ Time:  ____________                               [ ] Radha Carter      Electronically signed by Rosemary Long MD on 7/25/2022 at 1:02 PM

## 2022-07-26 NOTE — DISCHARGE INSTRUCTIONS
[address]  Physician Orders   [facility] [place]  [address], [place] [address]  Telephone: [phone]    NAME:  Gonzales Londono  YOB: 1926  MEDICAL RECORD NUMBER:  [id number]  DATE:  8/1/2022    Congratulations! You have completed your treatment. Return to your Primary Care Physician for all your health issues. Resume your ordinary activities as tolerated. Take your medications as prescribed by your primary care physician. Check your skin daily for cracks, bruises, sores, or dryness. Use a moisturizer as needed. Clean and dry your skin, using mild soap and warm water (not hot). Avoid alcohol and caffeine and do not smoke. Maintain a nutritious diet. Avoid pressure on your wound site. Keep your legs elevated above the level of the heart whenever possible.       **MEPILEX BORDER TO HEALED LEFT LEG WOUND - REMOVE ON 8/4/2022 AND MAY LEAVE OFF**      Physician Signature:______________________    Date: ___________ Time:  ____________    Jose Grimaldo CNP            Electronically signed by Fabio Duarte RN on 8/1/2022 at 11:30 AM

## 2022-08-01 ENCOUNTER — HOSPITAL ENCOUNTER (OUTPATIENT)
Dept: WOUND CARE | Age: 87
Discharge: HOME | End: 2022-08-01
Payer: MEDICARE

## 2022-08-01 VITALS
TEMPERATURE: 97.5 F | SYSTOLIC BLOOD PRESSURE: 156 MMHG | DIASTOLIC BLOOD PRESSURE: 72 MMHG | HEART RATE: 61 BPM | RESPIRATION RATE: 20 BRPM

## 2022-08-01 DIAGNOSIS — M79.89 LEG SWELLING: Primary | ICD-10-CM

## 2022-08-01 DIAGNOSIS — S81.802A TRAUMATIC OPEN WOUND OF LEFT LOWER LEG, INITIAL ENCOUNTER: ICD-10-CM

## 2022-08-01 PROCEDURE — 99212 OFFICE O/P EST SF 10 MIN: CPT | Performed by: NURSE PRACTITIONER

## 2022-08-01 PROCEDURE — 99212 OFFICE O/P EST SF 10 MIN: CPT

## 2022-08-01 RX ORDER — LIDOCAINE HYDROCHLORIDE 40 MG/ML
SOLUTION TOPICAL ONCE
OUTPATIENT
Start: 2022-08-01 | End: 2022-08-01

## 2022-08-01 RX ORDER — BACITRACIN, NEOMYCIN, POLYMYXIN B 400; 3.5; 5 [USP'U]/G; MG/G; [USP'U]/G
OINTMENT TOPICAL ONCE
OUTPATIENT
Start: 2022-08-01 | End: 2022-08-01

## 2022-08-01 RX ORDER — GENTAMICIN SULFATE 1 MG/G
OINTMENT TOPICAL ONCE
OUTPATIENT
Start: 2022-08-01 | End: 2022-08-01

## 2022-08-01 RX ORDER — BETAMETHASONE DIPROPIONATE 0.05 %
OINTMENT (GRAM) TOPICAL ONCE
OUTPATIENT
Start: 2022-08-01 | End: 2022-08-01

## 2022-08-01 RX ORDER — LIDOCAINE HYDROCHLORIDE 20 MG/ML
JELLY TOPICAL ONCE
OUTPATIENT
Start: 2022-08-01 | End: 2022-08-01

## 2022-08-01 RX ORDER — LIDOCAINE 50 MG/G
OINTMENT TOPICAL ONCE
OUTPATIENT
Start: 2022-08-01 | End: 2022-08-01

## 2022-08-01 RX ORDER — CLOBETASOL PROPIONATE 0.5 MG/G
OINTMENT TOPICAL ONCE
OUTPATIENT
Start: 2022-08-01 | End: 2022-08-01

## 2022-08-01 RX ORDER — LIDOCAINE 40 MG/G
CREAM TOPICAL ONCE
OUTPATIENT
Start: 2022-08-01 | End: 2022-08-01

## 2022-08-01 RX ORDER — GINSENG 100 MG
CAPSULE ORAL ONCE
OUTPATIENT
Start: 2022-08-01 | End: 2022-08-01

## 2022-08-01 RX ORDER — BACITRACIN ZINC AND POLYMYXIN B SULFATE 500; 1000 [USP'U]/G; [USP'U]/G
OINTMENT TOPICAL ONCE
OUTPATIENT
Start: 2022-08-01 | End: 2022-08-01

## 2022-08-01 ASSESSMENT — PAIN SCALES - GENERAL
PAINLEVEL_OUTOF10: 0
PAINLEVEL_OUTOF10: 0

## 2022-08-03 NOTE — PROGRESS NOTES
[address]   Progress Note and Procedure Note      [facility]  MEDICAL RECORD NUMBER:  [id number]  AGE: 80 y.o. GENDER: female  : 3/4/1926  EPISODE DATE:  2022    Subjective:     Chief Complaint   Patient presents with    Wound Check     Follow up visit left lower leg wound         HISTORY of PRESENT ILLNESS HPI    Kaden Hassan is a 80 y.o. female who presents today for wound/ulcer evaluation. History of Wound Context: Trauma. Around 22, patient was spraying wasps and hit her left anterior tibial with a branch from the bushes. She was initially seen at Urgent Care and given Keflex QID X7 days. She saw her PCP on 22, had a Tdap, restartted Keflex 500 BID X10d and started pso and dsd. She was seen by her PCP again on 22 and the wound was still oozing blood with persistent pain. She started Augmentin and was sent to the [facility] for further evaluation. With the use of Hydrofera Blue, her wound has healed. Wound/Ulcer Pain Timing/Severity: none  Quality of pain: N/A  Severity:  0 / 10  Modifying Factors:  N/A  Associated Signs/Symptoms: none     Ulcer Identification:  Ulcer Type: traumatic     Contributing Factors: edema (resolved)     Acute Wound: Other: LLE open wound     PAST MEDICAL HISTORY        Diagnosis Date    Allergic rhinitis     Hyperlipidemia     Urinary urgency        PAST SURGICAL HISTORY    Past Surgical History:   Procedure Laterality Date    CATARACT REMOVAL      SIGMOIDOSCOPY  2017    dr Nestor Dozier Mantle  polyp   hemorrhoids       FAMILY HISTORY    History reviewed. No pertinent family history.     SOCIAL HISTORY    Social History     Tobacco Use    Smoking status: Never    Smokeless tobacco: Never   Substance Use Topics    Alcohol use: Yes     Comment: socially     Drug use: No       ALLERGIES    Allergies   Allergen Reactions    Codeine     Sulfa Antibiotics        MEDICATIONS    Current Outpatient Medications on File Prior to Encounter   Medication Sig Dispense Refill    oxybutynin (DITROPAN-XL) 10 MG extended release tablet TAKE ONE TABLET BY MOUTH DAILY 90 tablet 0    simvastatin (ZOCOR) 20 MG tablet TAKE ONE TABLET BY MOUTH EVERY NIGHT AT BEDTIME 90 tablet 1    famotidine (PEPCID) 20 MG tablet Take 1 tablet by mouth 2 times daily 60 tablet 0    hydrocortisone (ANUSOL-HC) 2.5 % rectal cream Place rectally 2 times daily. 1 Tube 1    Multiple Vitamins-Minerals (ICAPS) CAPS Take  by mouth. [DISCONTINUED] oxybutynin (DITROPAN-XL) 10 MG CR tablet TAKE 1 TABLET BY MOUTH DAILY 90 tablet 2    Calcium Acetate, Phos Binder, (CALCIUM ACETATE PO) Take  by mouth. No current facility-administered medications on file prior to encounter. REVIEW OF SYSTEMS    Pertinent items are noted in HPI.       Objective:      BP (!) 156/72   Pulse 61   Temp 97.5 °F (36.4 °C) (Infrared)   Resp 20     Wt Readings from Last 3 Encounters:   07/11/22 116 lb (52.6 kg)   07/06/22 116 lb (52.6 kg)   06/28/22 115 lb (52.2 kg)       PHYSICAL EXAM    General Appearance: alert and oriented to person, place and time, well developed and well- nourished, in no acute distress  Skin: warm and dry, Left anterior tibial wound has healed  Head: normocephalic and atraumatic  Eyes: pupils equal, round, and reactive to light, extraocular eye movements intact, conjunctivae normal  Neck: supple  Pulmonary/Chest: normal air movement, no respiratory distress  Cardiovascular: left DP +2  Extremities: no cyanosis or clubbing, LLE no edema  Musculoskeletal: normal range of motion, no joint swelling, deformity or tenderness  Neurologic: reflexes normal and symmetric, no cranial nerve deficit, gait, coordination and speech normal      Assessment:        Problem List Items Addressed This Visit       Traumatic open wound of left lower leg    Leg swelling - Primary        Wound 07/11/22 Leg Left;Lateral #1 (since about 7/4/22) (Active)   Wound Image   08/01/22 1138   Wound Cleansed Cleansed with saline 07/25/22 1115   Dressing/Treatment Silicone border 35/38/24 1138   Wound Length (cm) 0.3 cm 08/01/22 1050   Wound Width (cm) 0.3 cm 08/01/22 1050   Wound Depth (cm) 0.1 cm 08/01/22 1050   Wound Surface Area (cm^2) 0.09 cm^2 08/01/22 1050   Change in Wound Size % (l*w) 97.86 08/01/22 1050   Wound Volume (cm^3) 0.009 cm^3 08/01/22 1050   Wound Healing % 98 08/01/22 1050   Post-Procedure Length (cm) 0 cm 08/01/22 1129   Post-Procedure Width (cm) 0 cm 08/01/22 1129   Post-Procedure Depth (cm) 0 cm 08/01/22 1129   Post-Procedure Surface Area (cm^2) 0 cm^2 08/01/22 1129   Post-Procedure Volume (cm^3) 0 cm^3 08/01/22 1129   Wound Assessment Epithelialization 08/01/22 1129   Drainage Amount None 08/01/22 1050   Drainage Description Serosanguinous 07/25/22 1044   Odor None 08/01/22 1050   Mechelle-wound Assessment Dry/flaky 08/01/22 1050   Margins Undefined edges 08/01/22 1050   Wound Thickness Description not for Pressure Injury Full thickness 08/01/22 1050   Number of days: 22     Her wound has healed. We applied a Mepilex Border to the site to allow the skin to thicken. She was instructed to remove it in 3 days and not to scrub her leg with a washcloth for a while, but rather, let the water run over it and pat it dry. Plan:     Treatment Note please see attached Discharge Instructions    Written patient dismissal instructions given to patient and signed by patient or POA. Discharge Instructions           [address] [facility] AT [address] [address], [facility] [address]  Telephone: [phone]    NAME:  King Bailey  YOB: 1926  MEDICAL RECORD NUMBER:  [id number]  DATE:  8/1/2022    Congratulations! You have completed your treatment. Return to your Primary Care Physician for all your health issues. Resume your ordinary activities as tolerated.    Take your medications as prescribed by your primary care physician. Check your skin daily for cracks, bruises, sores, or dryness. Use a moisturizer as needed. Clean and dry your skin, using mild soap and warm water (not hot). Avoid alcohol and caffeine and do not smoke. Maintain a nutritious diet. Avoid pressure on your wound site. Keep your legs elevated above the level of the heart whenever possible.       **MEPILEX BORDER TO HEALED LEFT LEG WOUND - REMOVE ON 8/4/2022 AND MAY LEAVE OFF**      Physician Signature:______________________    Date: ___________ Time:  ____________    Jennifer Carrera CNP            Electronically signed by Melinda Love RN on 8/1/2022 at 11:30 AM                           Electronically signed by JOAQUIN Garay CNP on 8/2/2022 at 8:08 PM

## 2023-01-04 PROBLEM — R42 DIZZINESS: Status: ACTIVE | Noted: 2023-01-04

## 2023-02-20 ENCOUNTER — HOSPITAL ENCOUNTER (OUTPATIENT)
Dept: NON INVASIVE DIAGNOSTICS | Age: 88
Discharge: HOME | End: 2023-02-20
Payer: MEDICARE

## 2023-02-20 DIAGNOSIS — I51.9 HEART DISEASE: ICD-10-CM

## 2023-02-20 DIAGNOSIS — R01.1 HEART MURMUR: ICD-10-CM

## 2023-02-20 DIAGNOSIS — R94.31 ABNORMAL ELECTROCARDIOGRAM (ECG) (EKG): ICD-10-CM

## 2023-02-20 LAB
EKG ATRIAL RATE: 258 BPM
EKG DIAGNOSIS: NORMAL
EKG Q-T INTERVAL: 398 MS
EKG QRS DURATION: 76 MS
EKG QTC CALCULATION (BAZETT): 432 MS
EKG R AXIS: 59 DEGREES
EKG T AXIS: 70 DEGREES
EKG VENTRICULAR RATE: 71 BPM
LV EF: 71 %
LVEF MODALITY: NORMAL

## 2023-02-20 PROCEDURE — 93017 CV STRESS TEST TRACING ONLY: CPT

## 2023-02-20 PROCEDURE — A9502 TC99M TETROFOSMIN: HCPCS | Performed by: INTERNAL MEDICINE

## 2023-02-20 PROCEDURE — 3430000000 HC RX DIAGNOSTIC RADIOPHARMACEUTICAL

## 2023-02-20 PROCEDURE — 78452 HT MUSCLE IMAGE SPECT MULT: CPT

## 2023-02-20 PROCEDURE — 93005 ELECTROCARDIOGRAM TRACING: CPT

## 2023-02-20 PROCEDURE — 6360000002 HC RX W HCPCS: Performed by: INTERNAL MEDICINE

## 2023-02-20 PROCEDURE — A9502 TC99M TETROFOSMIN: HCPCS

## 2023-02-20 PROCEDURE — 3430000000 HC RX DIAGNOSTIC RADIOPHARMACEUTICAL: Performed by: INTERNAL MEDICINE

## 2023-02-20 RX ADMIN — TETROFOSMIN 30 MILLICURIE: 1.38 INJECTION, POWDER, LYOPHILIZED, FOR SOLUTION INTRAVENOUS at 11:16

## 2023-02-20 RX ADMIN — TETROFOSMIN 10 MILLICURIE: 1.38 INJECTION, POWDER, LYOPHILIZED, FOR SOLUTION INTRAVENOUS at 09:40

## 2023-02-20 RX ADMIN — REGADENOSON 0.4 MG: 0.08 INJECTION, SOLUTION INTRAVENOUS at 11:12

## 2023-02-20 NOTE — PROGRESS NOTES
Patient to [facility] for [employer] stress test. Patient attached to 12-lead EKG for stress test. Patient found to be in atrial fibrillation, controlled rate at 78 bpm. Patient has no history of a-fib in her chart, nor is she aware of this finding. Dr. Meagan Paris made aware and order obtained by MD for an EKG. MD stated ok to proceed with stress test and to have the patient double her ASA at home. MD also stated he will have his office set up an appointment for the patient with cardiology to establish care. Patient made aware and is in agreement with treatment plan. Patient stable at this time.      Electronically signed by Mercedez Oconnor RN on 2/20/2023 at 11:09 AM

## 2023-02-27 PROBLEM — I48.0 AF (PAROXYSMAL ATRIAL FIBRILLATION) (HCC): Status: ACTIVE | Noted: 2023-02-27

## 2023-02-27 PROBLEM — S09.90XA HEAD TRAUMA: Status: ACTIVE | Noted: 2023-02-27

## 2024-10-21 PROBLEM — S72.001D CLOSED FRACTURE OF RIGHT HIP WITH ROUTINE HEALING: Status: ACTIVE | Noted: 2024-10-21

## 2024-11-08 ENCOUNTER — CARE COORDINATION (OUTPATIENT)
Dept: CASE MANAGEMENT | Age: 89
End: 2024-11-08

## 2024-11-08 NOTE — CARE COORDINATION
Care Transitions Note    Initial Call - Call within 2 business days of discharge: Yes    Patient Current Location:  Home: [address] [place] [id number]    Post Acute Care Manager contacted the family, Mikey  by telephone to perform post hospital discharge assessment, verified name and  as identifiers. Provided introduction to self, and explanation of the Post Acute Care Manager role.     Patient: Laura EID Mccormack    Patient : 3/4/1926   MRN: [id number]    Reason for Admission: fall and multiple fx with femur, rib and needed Dawes J collar.   Discharge Date:    RURS: No data recorded    Last Discharge Facility       None            Was this an external facility discharge? Yes. Discharge Date: . Facility Name:   [facility] [place] [facility] [place] [facility]    Additional needs identified to be addressed with provider   No needs identified             Method of communication with provider: none.    Patients top risk factors for readmission: functional physical ability with recent fx    Interventions to address risk factors:   Education: Reviewed appts and pt no longer needs to wear Dawes J Collar    Care Summary Note: Spoke with Mikey after 2 IDs. Pt is doing ok. She has only been home one day. Initially I called pt and pt had caregiver, Queenie, answer and asked that I call Mikey. [facility] has been set up and she has her meds. He has no questions from the 2 recent appts with ortho, neurosurgery, and PCP. Educated on constipation. He has no questions on medications. They got meds from [facility] and she had some at home. Knows to request refills from PCP. Agreeable to calls. Left my contact information.     Post Acute Care Manager reviewed red flags with family. The family was given an opportunity to ask questions; all questions answered at this time.. The family verbalized understanding.   Were discharge instructions available to patient? Yes.   Reviewed appropriate site of care based on symptoms and resources available to

## 2024-11-15 ENCOUNTER — CARE COORDINATION (OUTPATIENT)
Dept: CARE COORDINATION | Age: 89
End: 2024-11-15

## 2024-11-15 SDOH — ECONOMIC STABILITY: TRANSPORTATION INSECURITY
IN THE PAST 12 MONTHS, HAS LACK OF TRANSPORTATION KEPT YOU FROM MEETINGS, WORK, OR FROM GETTING THINGS NEEDED FOR DAILY LIVING?: NO

## 2024-11-15 SDOH — HEALTH STABILITY: PHYSICAL HEALTH: ON AVERAGE, HOW MANY DAYS PER WEEK DO YOU ENGAGE IN MODERATE TO STRENUOUS EXERCISE (LIKE A BRISK WALK)?: 3 DAYS

## 2024-11-15 SDOH — ECONOMIC STABILITY: FOOD INSECURITY: WITHIN THE PAST 12 MONTHS, THE FOOD YOU BOUGHT JUST DIDN'T LAST AND YOU DIDN'T HAVE MONEY TO GET MORE.: NEVER TRUE

## 2024-11-15 SDOH — ECONOMIC STABILITY: INCOME INSECURITY: HOW HARD IS IT FOR YOU TO PAY FOR THE VERY BASICS LIKE FOOD, HOUSING, MEDICAL CARE, AND HEATING?: NOT HARD AT ALL

## 2024-11-15 SDOH — SOCIAL STABILITY: SOCIAL NETWORK
IN A TYPICAL WEEK, HOW MANY TIMES DO YOU TALK ON THE PHONE WITH FAMILY, FRIENDS, OR NEIGHBORS?: MORE THAN THREE TIMES A WEEK

## 2024-11-15 SDOH — ECONOMIC STABILITY: FOOD INSECURITY: WITHIN THE PAST 12 MONTHS, YOU WORRIED THAT YOUR FOOD WOULD RUN OUT BEFORE YOU GOT MONEY TO BUY MORE.: NEVER TRUE

## 2024-11-15 SDOH — SOCIAL STABILITY: SOCIAL NETWORK: HOW OFTEN DO YOU GET TOGETHER WITH FRIENDS OR RELATIVES?: MORE THAN THREE TIMES A WEEK

## 2024-11-15 SDOH — ECONOMIC STABILITY: INCOME INSECURITY: IN THE LAST 12 MONTHS, WAS THERE A TIME WHEN YOU WERE NOT ABLE TO PAY THE MORTGAGE OR RENT ON TIME?: NO

## 2024-11-15 SDOH — ECONOMIC STABILITY: TRANSPORTATION INSECURITY
IN THE PAST 12 MONTHS, HAS THE LACK OF TRANSPORTATION KEPT YOU FROM MEDICAL APPOINTMENTS OR FROM GETTING MEDICATIONS?: NO

## 2024-11-15 SDOH — HEALTH STABILITY: PHYSICAL HEALTH: ON AVERAGE, HOW MANY MINUTES DO YOU ENGAGE IN EXERCISE AT THIS LEVEL?: 30 MIN

## 2024-11-15 ASSESSMENT — LIFESTYLE VARIABLES
HOW MANY STANDARD DRINKS CONTAINING ALCOHOL DO YOU HAVE ON A TYPICAL DAY: PATIENT DOES NOT DRINK
HOW OFTEN DO YOU HAVE A DRINK CONTAINING ALCOHOL: NEVER

## 2024-11-15 NOTE — CARE COORDINATION
Ambulatory Care Coordination Note     11/15/2024 4:03 PM     Patient Current Location:  Home: [address] [place] [id number]     This patient was received as a referral from Care Transition Nurse.    ACM contacted the patient by telephone. Verified name and  with patient as identifiers. Provided introduction to self, and explanation of the ACM role.   Patient accepted care management services at this time.          ACM: Christine Iverson RN     Challenges to be reviewed by the provider   Additional needs identified to be addressed with provider No  none               Method of communication with provider: none.    Utilization: N/A - Initial Call     Care Summary Note: ACM spoke to pt who is agreeable to ongoing care management.  Pt lives at home alone, has HHC, aide who comes 12 hrs/day, HHC and family who support her.  Pt son does grocery shopping for her.  Daughter stays with her on the weekends.  Pt has been seen by [facility], said she did not qualify for additional services.  Falls education sent via regular mail.  Pt has a walker, is getting grab bars installed. SDOH completed, no new needs identified.     Offered patient enrollment in the Remote Patient Monitoring (RPM) program for in-home monitoring: Patient is not eligible for RPM program because: patient does not have qualifying diagnosis.     Assessments Completed:       11/15/2024     3:59 PM   Amb Fall Risk Assessment and TUG Test   Do you feel unsteady or are you worried about falling?  no   2 or more falls in past year? no   Fall with injury in past year? yes    ,   Ambulatory Care Coordination Assessment    Care Coordination Protocol  Referral from Primary Care Provider: No  Week 1 - Initial Assessment     Do you have all of your prescriptions and are they filled?: Yes  How often do you have trouble taking your medications the way you have been told to take them?: I always take them as prescribed.           Current Housing: Private

## 2024-11-26 ENCOUNTER — CARE COORDINATION (OUTPATIENT)
Dept: CARE COORDINATION | Age: 88
End: 2024-11-26

## 2024-11-26 NOTE — CARE COORDINATION
Ambulatory Care Coordination Note     2024 4:05 PM     Patient Current Location:  Home: [address] [place] [id number]     ACM contacted the patient by telephone. Verified name and  with patient as identifiers.         ACM: Christine Iverson RN     Challenges to be reviewed by the provider   Additional needs identified to be addressed with provider No  none               Method of communication with provider: none.    Utilization: Patient has not had any utilization since our last call.     Care Summary Note: CC f/u completed.  Pt was pleasant and engaged with no s/s of disease exacerbation.  Pt taking medications as prescribed.   Pt is using her walker, no longer needing therapy.  Pt has an aide who comes daily to see her.  Pt reports that she is not having any pain and feels like she is doing well.  Pt has no PRN ACM needs at this time.     Offered patient enrollment in the Remote Patient Monitoring (RPM) program for in-home monitoring: Patient is not eligible for RPM program because: patient does not have qualifying diagnosis.     Assessments Completed:   General Assessment    Do you have any symptoms that are causing concern?: Yes  Progression since Onset: Gradually Improving  Reported Symptoms: Other (Comment: femur fx)          Medications Reviewed:   Completed during a previous call     Advance Care Planning:   Not reviewed during this call     Care Planning:   Education Documentation  No documentation found.  Education Comments  No comments found.     ,    Goals Addressed                   This Visit's Progress     Reduce Falls    On track     I will reduce my risk of falls by the following: Remove rugs or use non slip rugs  Install grab bars in bathroom  Use walking aids like cane or walker  Follow through on orders for PT    Barriers: fear of failure  Plan for overcoming my barriers: support and resources  Confidence: 9/10  Anticipated Goal Completion Date: 02/15/2025

## 2024-12-17 ENCOUNTER — CARE COORDINATION (OUTPATIENT)
Dept: CARE COORDINATION | Age: 88
End: 2024-12-17

## 2024-12-17 NOTE — CARE COORDINATION
Ambulatory Care Coordination Note     12/17/2024 2:27 PM     Patient outreach attempt by this ACM today to perform care management follow up . ACM was unable to reach the patient by telephone today;   voicemail full and unable to leave a message.      ACM: Christine Iverson RN     Care Summary Note: UTRx2    PCP/Specialist follow up:   Future Appointments         Provider Specialty Dept Phone    1/7/2025 2:00 PM Artem Del Rio MD Internal Medicine [phone]            Follow Up:   Plan for next ACM outreach in approximately 2 weeks to complete:  - advance care planning  - goal progression  - education .

## 2025-01-06 ENCOUNTER — CARE COORDINATION (OUTPATIENT)
Dept: CARE COORDINATION | Age: 89
End: 2025-01-06

## 2025-01-06 NOTE — CARE COORDINATION
Ambulatory Care Coordination Note     2025 3:21 PM     Patient Current Location:  Home: [address] [place] [id number]     ACM contacted the patient by telephone. Verified name and  with patient as identifiers.         ACM: Christine Iverson RN     Challenges to be reviewed by the provider   Additional needs identified to be addressed with provider No  none               Method of communication with provider: none.    Utilization: Patient has not had any utilization since our last call.     Care Summary Note: CC f/u completed.  Pt was pleasant and engaged with no s/s of disease exacerbation.  Pt taking medications as prescribed.   Pt states that he care giver left at noon due to weather.  Pt has no PRN ACM needs at this time.  PT is not in distress.  Discussion was difficult due to pt being [place], but she was pleasant and engaged.      Offered patient enrollment in the Remote Patient Monitoring (RPM) program for in-home monitoring: Yes, but did not enroll at this time: limited patient ability to navigate RPM/equipment.     Assessments Completed:   No changes since last call    Medications Reviewed:   Patient denies any changes with medications and reports taking all medications as prescribed.    Advance Care Planning:   Not reviewed during this call     Care Planning:   Not completed during this call    PCP/Specialist follow up:   Future Appointments         Provider Specialty Dept Phone    2025 2:00 PM Artem Del Rio MD Internal Medicine [phone]            Follow Up:   Plan for next ACM outreach in approximately 2 weeks to complete:  - advance care planning   - goal progression  - education .   Patient  is agreeable to this plan.

## 2025-01-22 ENCOUNTER — CARE COORDINATION (OUTPATIENT)
Dept: CASE MANAGEMENT | Age: 89
End: 2025-01-22

## 2025-01-22 NOTE — CARE COORDINATION
Ambulatory Care Coordination Note     1/22/2025 11:24 AM     Patient outreach attempt by this ACM today to perform care management follow up .      ACM: Cathy Mendoza LPN     Care Summary Note: LPN CC spoke very briefly with patient. She is [place] and was under the impression that this nurse was with HC. LPN CC unable to persuade patient otherwise. She was pleasant and hung up the phone, expecting HHC today.     Thank you,   Cathy Mendoza LPN Care Coordinator   [facility]  Remote Patient Monitoring, Care Transitions, Ambulatory Care Management  [phone]      PCP/Specialist follow up:   Future Appointments         Provider Specialty Dept Phone    1/27/2025 4:40 PM Artem Del Rio MD Internal Medicine [phone]            Follow Up:   Plan for next ACM outreach in approximately 1 week to complete:  - advance care planning  - goal progression  - education .

## 2025-01-29 ENCOUNTER — CARE COORDINATION (OUTPATIENT)
Dept: CARE COORDINATION | Age: 89
End: 2025-01-29

## 2025-01-29 NOTE — CARE COORDINATION
Ambulatory Care Coordination Note     2025 1:43 PM     Patient Current Location:  Home: [address] [place] [id number]     ACM contacted the patient by telephone. Verified name and  with patient as identifiers.         ACM: Christine Iverson RN     Challenges to be reviewed by the provider   Additional needs identified to be addressed with provider No  none               Method of communication with provider: none.    Utilization: Patient has not had any utilization since our last call.     Care Summary Note: CC f/u completed.  Pt was pleasant and engaged with no s/s of disease exacerbation.  Pt taking medications as prescribed.   Pt has an aide who comes from 6-12 daily.  Pt states she is doing well, has no swelling.  Pt is able to discuss the importance of keeping appointments.  Pt has not had a call or HHC set up, but pt was just seen by office on  and referral made to [facility].  Pt has no PRN ACM needs at this time.     Offered patient enrollment in the Remote Patient Monitoring (RPM) program for in-home monitoring: Yes, but did not enroll at this time: limited patient ability to navigate RPM/equipment.     Assessments Completed:   Hypertension - Encounter Level              Medications Reviewed:   Completed during a previous call     Advance Care Planning:   Not reviewed during this call     Care Planning:   Education Documentation  No documentation found.  Education Comments  No comments found.     ,    Goals Addressed                   This Visit's Progress     Reduce Falls    On track     I will reduce my risk of falls by the following: Remove rugs or use non slip rugs  Install grab bars in bathroom  Use walking aids like cane or walker  Follow through on orders for PT    Barriers: fear of failure  Plan for overcoming my barriers: support and resources  Confidence: 9/10  Anticipated Goal Completion Date: 02/15/2025               PCP/Specialist follow up:   Future Appointments

## 2025-02-19 ENCOUNTER — CARE COORDINATION (OUTPATIENT)
Dept: CARE COORDINATION | Age: 89
End: 2025-02-19

## 2025-02-19 NOTE — CARE COORDINATION
Ambulatory Care Coordination Note     2025 2:14 PM     Patient Current Location:  Home: [address] [place] [phone]     ACM contacted the patient by telephone. Verified name and  with patient as identifiers.         ACM: Glory Wesley     Challenges to be reviewed by the provider   Additional needs identified to be addressed with provider No  none               Method of communication with provider: none.    Utilization: Patient has not had any utilization since our last call.     Care Summary Note: Pt states she's doing well. Denies any CP, palpitations, SOB, lightheadedness dizziness, or other symptoms or concerns. She worked with therapy this morning and reports getting around well with a walker or cane depending on the distance. She states she's taking all medications as prescribed. HHC and daily aid active. Denies questions or concerns at this time.     Offered patient enrollment in the Remote Patient Monitoring (RPM) program for in-home monitoring: Yes, but did not enroll at this time: limited patient ability to navigate RPM/equipment.     Assessments Completed:   Hypertension - Encounter Level    Symptom course: stable      ,   General Assessment    Do you have any symptoms that are causing concern?: No          Medications Reviewed:   Patient denies any changes with medications and reports taking all medications as prescribed.    Advance Care Planning:   Not reviewed during this call     Care Planning:    Goals Addressed                   This Visit's Progress     Reduce Falls    On track     I will reduce my risk of falls by the following: Remove rugs or use non slip rugs  Install grab bars in bathroom  Use walking aids like cane or walker  Follow through on orders for PT    Barriers: fear of failure  Plan for overcoming my barriers: support and resources  Confidence: 9/10  Anticipated Goal Completion Date: 02/15/2025               PCP/Specialist follow up:   Future Appointments

## 2025-03-10 ENCOUNTER — CARE COORDINATION (OUTPATIENT)
Dept: CARE COORDINATION | Age: 89
End: 2025-03-10

## 2025-03-10 NOTE — CARE COORDINATION
Ambulatory Care Coordination Note     3/10/2025 1:34 PM     Patient outreach attempt by this ACM today to perform care management follow up . ACM was unable to reach the patient by telephone today;   Phone rang and no one picked up x10     ACM: Christine Iverson, RN     Care Summary Note: UTRx1    PCP/Specialist follow up:   Future Appointments         Provider Specialty Dept Phone    7/28/2025 2:00 PM Artem Del Rio MD Internal Medicine [phone]            Follow Up:   Plan for next ACM outreach in approximately 3 weeks to complete:  ACP and graduation likely .

## 2025-04-02 ENCOUNTER — CARE COORDINATION (OUTPATIENT)
Dept: CARE COORDINATION | Age: 89
End: 2025-04-02

## 2025-04-02 NOTE — CARE COORDINATION
Ambulatory Care Coordination Note     2025 4:15 PM     Patient Current Location:  Home: [address] [place] [phone]     ACM contacted the patient by telephone. Verified name and  with patient as identifiers.     Patient graduated from the High Risk Care Management program on 2025.  Patient verbalizes confidence in the ability to self-manage at this time..  Care management goals have been completed. No further Ambulatory Care Manager follow up scheduled.      ACM: Christine Iverson RN     Challenges to be reviewed by the provider   Additional needs identified to be addressed with provider No  none               Method of communication with provider: none.    Utilization: Patient has not had any utilization since our last call.     Care Summary Note: ACM signing off of care team.  Pt is self managing.  She has support from family and an aide who comes 5x/week and assists her as needed.     Offered patient enrollment in the Remote Patient Monitoring (RPM) program for in-home monitoring: Yes, but did not enroll at this time: controlled chronic disease management.     Assessments Completed:   No changes since last call    Medications Reviewed:   Patient denies any changes with medications and reports taking all medications as prescribed.    Advance Care Planning:   Health Care Decision maker updated     Care Planning:   Education Documentation  No documentation found.  Education Comments  No comments found.     ,    Goals Addressed                   This Visit's Progress     COMPLETED: Reduce Falls         I will reduce my risk of falls by the following: Remove rugs or use non slip rugs  Install grab bars in bathroom  Use walking aids like cane or walker  Follow through on orders for PT    Barriers: fear of failure  Plan for overcoming my barriers: support and resources  Confidence: 9/10  Anticipated Goal Completion Date: 02/15/2025               PCP/Specialist follow up:   Future Appointments